# Patient Record
Sex: FEMALE | Race: WHITE | NOT HISPANIC OR LATINO | Employment: OTHER | URBAN - METROPOLITAN AREA
[De-identification: names, ages, dates, MRNs, and addresses within clinical notes are randomized per-mention and may not be internally consistent; named-entity substitution may affect disease eponyms.]

---

## 2017-01-30 ENCOUNTER — TRANSCRIBE ORDERS (OUTPATIENT)
Dept: MAMMOGRAPHY | Facility: HOSPITAL | Age: 67
End: 2017-01-30

## 2017-01-30 ENCOUNTER — HOSPITAL ENCOUNTER (OUTPATIENT)
Dept: MAMMOGRAPHY | Facility: HOSPITAL | Age: 67
Discharge: HOME | End: 2017-01-30
Attending: OBSTETRICS & GYNECOLOGY | Admitting: OBSTETRICS & GYNECOLOGY

## 2017-01-30 DIAGNOSIS — R92.8 ABNORMAL MAMMOGRAM: ICD-10-CM

## 2017-01-30 DIAGNOSIS — R92.8 ABNORMAL MAMMOGRAM: Primary | ICD-10-CM

## 2017-01-30 PROCEDURE — G0206 DX MAMMO INCL CAD UNI: HCPCS | Performed by: RADIOLOGY

## 2017-01-30 PROCEDURE — G0206 DX MAMMO INCL CAD UNI: HCPCS

## 2017-07-31 ENCOUNTER — HOSPITAL ENCOUNTER (OUTPATIENT)
Dept: MAMMOGRAPHY | Facility: HOSPITAL | Age: 67
Discharge: HOME | End: 2017-07-31
Attending: OBSTETRICS & GYNECOLOGY | Admitting: OBSTETRICS & GYNECOLOGY

## 2017-07-31 DIAGNOSIS — R92.8 ABNORMAL MAMMOGRAM: ICD-10-CM

## 2017-07-31 PROCEDURE — G0279 TOMOSYNTHESIS, MAMMO: HCPCS | Performed by: RADIOLOGY

## 2017-07-31 PROCEDURE — G0204 DX MAMMO INCL CAD BI: HCPCS

## 2017-07-31 PROCEDURE — G0204 DX MAMMO INCL CAD BI: HCPCS | Performed by: RADIOLOGY

## 2017-07-31 PROCEDURE — G0279 TOMOSYNTHESIS, MAMMO: HCPCS

## 2017-09-05 ENCOUNTER — TRANSCRIBE ORDERS (OUTPATIENT)
Dept: ADMINISTRATIVE | Facility: HOSPITAL | Age: 67
End: 2017-09-05

## 2017-09-05 DIAGNOSIS — R92.8 ABNORMAL MAMMOGRAM: Primary | ICD-10-CM

## 2018-08-01 ENCOUNTER — HOSPITAL ENCOUNTER (OUTPATIENT)
Dept: ULTRASOUND IMAGING | Facility: HOSPITAL | Age: 68
Discharge: HOME | End: 2018-08-01

## 2018-08-01 ENCOUNTER — HOSPITAL ENCOUNTER (OUTPATIENT)
Dept: MAMMOGRAPHY | Facility: HOSPITAL | Age: 68
Discharge: HOME | End: 2018-08-01
Attending: OBSTETRICS & GYNECOLOGY | Admitting: OBSTETRICS & GYNECOLOGY

## 2018-08-01 DIAGNOSIS — R92.8 ABNORMAL MAMMOGRAM: ICD-10-CM

## 2018-08-01 PROCEDURE — 77066 DX MAMMO INCL CAD BI: CPT | Performed by: RADIOLOGY

## 2018-08-01 PROCEDURE — 76642 ULTRASOUND BREAST LIMITED: CPT

## 2018-08-01 PROCEDURE — G0279 TOMOSYNTHESIS, MAMMO: HCPCS | Performed by: RADIOLOGY

## 2018-08-01 PROCEDURE — 76642 ULTRASOUND BREAST LIMITED: CPT | Performed by: RADIOLOGY

## 2018-08-01 PROCEDURE — 77066 DX MAMMO INCL CAD BI: CPT

## 2018-08-01 PROCEDURE — G0279 TOMOSYNTHESIS, MAMMO: HCPCS

## 2018-08-31 ENCOUNTER — TRANSCRIBE ORDERS (OUTPATIENT)
Dept: ADMINISTRATIVE | Facility: HOSPITAL | Age: 68
End: 2018-08-31

## 2018-08-31 DIAGNOSIS — Z12.31 VISIT FOR SCREENING MAMMOGRAM: Primary | ICD-10-CM

## 2018-09-14 ENCOUNTER — LAB REQUISITION (OUTPATIENT)
Dept: LAB | Facility: HOSPITAL | Age: 68
End: 2018-09-14

## 2018-09-14 DIAGNOSIS — Z00.00 ROUTINE GENERAL MEDICAL EXAMINATION AT A HEALTH CARE FACILITY: ICD-10-CM

## 2018-09-14 LAB
APPEARANCE FLD: ABNORMAL
COLOR FLD: YELLOW
LYMPHOCYTES NFR FLD MANUAL: 15 %
MACROPHAGE FLUID: 21 %
MONOCYTES NFR FLD: 2 %
NEUTROPHILS NFR FLD MANUAL: 62 %
RBC # FLD AUTO: 8000 /MM3
WBC # FLD: 891 /MM3

## 2018-09-14 PROCEDURE — 89051 BODY FLUID CELL COUNT: CPT

## 2019-08-02 ENCOUNTER — OFFICE VISIT (OUTPATIENT)
Dept: NEUROLOGY | Facility: CLINIC | Age: 69
End: 2019-08-02

## 2019-08-02 ENCOUNTER — HOSPITAL ENCOUNTER (OUTPATIENT)
Dept: MAMMOGRAPHY | Facility: HOSPITAL | Age: 69
Discharge: HOME | End: 2019-08-02
Attending: OBSTETRICS & GYNECOLOGY | Admitting: OBSTETRICS & GYNECOLOGY

## 2019-08-02 VITALS
SYSTOLIC BLOOD PRESSURE: 130 MMHG | OXYGEN SATURATION: 96 % | HEIGHT: 59 IN | WEIGHT: 210 LBS | BODY MASS INDEX: 42.33 KG/M2 | HEART RATE: 108 BPM | DIASTOLIC BLOOD PRESSURE: 90 MMHG

## 2019-08-02 DIAGNOSIS — M51.34 DDD (DEGENERATIVE DISC DISEASE), THORACIC: ICD-10-CM

## 2019-08-02 DIAGNOSIS — R26.9 GAIT ABNORMALITY: ICD-10-CM

## 2019-08-02 DIAGNOSIS — Z12.31 VISIT FOR SCREENING MAMMOGRAM: ICD-10-CM

## 2019-08-02 DIAGNOSIS — R27.0 ATAXIA: Primary | ICD-10-CM

## 2019-08-02 DIAGNOSIS — M51.36 DDD (DEGENERATIVE DISC DISEASE), LUMBAR: ICD-10-CM

## 2019-08-02 PROBLEM — M51.369 DDD (DEGENERATIVE DISC DISEASE), LUMBAR: Status: ACTIVE | Noted: 2019-08-02

## 2019-08-02 PROCEDURE — 77063 BREAST TOMOSYNTHESIS BI: CPT

## 2019-08-02 PROCEDURE — 77067 SCR MAMMO BI INCL CAD: CPT

## 2019-08-02 PROCEDURE — 77067 SCR MAMMO BI INCL CAD: CPT | Performed by: RADIOLOGY

## 2019-08-02 PROCEDURE — 99204 OFFICE O/P NEW MOD 45 MIN: CPT | Performed by: NURSE PRACTITIONER

## 2019-08-02 PROCEDURE — 77063 BREAST TOMOSYNTHESIS BI: CPT | Performed by: RADIOLOGY

## 2019-08-02 RX ORDER — GUAIFENESIN, PSEUDOEPHEDRINE HYDROCHLORIDE 600; 60 MG/1; MG/1
TABLET, EXTENDED RELEASE ORAL
COMMUNITY
End: 2020-06-05

## 2019-08-02 RX ORDER — ASCORBIC ACID 500 MG
500 TABLET ORAL DAILY
COMMUNITY

## 2019-08-02 RX ORDER — LOSARTAN POTASSIUM 100 MG/1
TABLET ORAL
COMMUNITY
End: 2022-08-22 | Stop reason: ALTCHOICE

## 2019-08-02 RX ORDER — FUROSEMIDE 20 MG/1
TABLET ORAL
COMMUNITY
End: 2021-12-14

## 2019-08-02 RX ORDER — CHLORAL HYDRATE 500 MG
CAPSULE ORAL
COMMUNITY

## 2019-08-02 RX ORDER — MONTELUKAST SODIUM 10 MG/1
TABLET ORAL
COMMUNITY

## 2019-08-02 RX ORDER — DULOXETIN HYDROCHLORIDE 60 MG/1
CAPSULE, DELAYED RELEASE ORAL
COMMUNITY
End: 2021-12-14

## 2019-08-02 RX ORDER — GABAPENTIN 300 MG/1
300 CAPSULE ORAL 3 TIMES DAILY
Refills: 1 | COMMUNITY
Start: 2019-07-27 | End: 2019-11-08 | Stop reason: SDUPTHER

## 2019-09-23 ENCOUNTER — TELEPHONE (OUTPATIENT)
Dept: NEUROLOGY | Facility: CLINIC | Age: 69
End: 2019-09-23

## 2019-11-08 ENCOUNTER — HOSPITAL ENCOUNTER (OUTPATIENT)
Dept: GENERAL RADIOLOGY | Facility: HOSPITAL | Age: 69
Discharge: HOME | End: 2019-11-08
Admitting: NURSE PRACTITIONER

## 2019-11-08 ENCOUNTER — OFFICE VISIT (OUTPATIENT)
Dept: NEUROLOGY | Facility: CLINIC | Age: 69
End: 2019-11-08

## 2019-11-08 ENCOUNTER — TELEPHONE (OUTPATIENT)
Dept: NEUROLOGY | Facility: CLINIC | Age: 69
End: 2019-11-08

## 2019-11-08 VITALS
BODY MASS INDEX: 42.33 KG/M2 | HEART RATE: 86 BPM | SYSTOLIC BLOOD PRESSURE: 130 MMHG | WEIGHT: 210 LBS | OXYGEN SATURATION: 98 % | DIASTOLIC BLOOD PRESSURE: 80 MMHG | HEIGHT: 59 IN

## 2019-11-08 DIAGNOSIS — R26.9 GAIT ABNORMALITY: ICD-10-CM

## 2019-11-08 DIAGNOSIS — R27.0 ATAXIA: Primary | ICD-10-CM

## 2019-11-08 DIAGNOSIS — I67.89 CEREBRAL MICROVASCULAR DISEASE: ICD-10-CM

## 2019-11-08 DIAGNOSIS — G89.29 CHRONIC PAIN OF MULTIPLE JOINTS: ICD-10-CM

## 2019-11-08 DIAGNOSIS — M51.36 DDD (DEGENERATIVE DISC DISEASE), LUMBAR: ICD-10-CM

## 2019-11-08 DIAGNOSIS — R27.0 ATAXIA: ICD-10-CM

## 2019-11-08 DIAGNOSIS — M79.2 NEUROPATHIC PAIN: ICD-10-CM

## 2019-11-08 DIAGNOSIS — M25.50 CHRONIC PAIN OF MULTIPLE JOINTS: ICD-10-CM

## 2019-11-08 DIAGNOSIS — M51.34 DDD (DEGENERATIVE DISC DISEASE), THORACIC: ICD-10-CM

## 2019-11-08 PROCEDURE — 72040 X-RAY EXAM NECK SPINE 2-3 VW: CPT

## 2019-11-08 PROCEDURE — 99214 OFFICE O/P EST MOD 30 MIN: CPT | Performed by: NURSE PRACTITIONER

## 2019-11-08 RX ORDER — GABAPENTIN 300 MG/1
300 CAPSULE ORAL 2 TIMES DAILY
Qty: 180 CAPSULE | Refills: 1 | Status: SHIPPED | OUTPATIENT
Start: 2019-11-08 | End: 2020-06-05

## 2019-11-08 NOTE — PROGRESS NOTES
Subjective:     Patient ID: Juanis Carpenter is a 68 y.o. female.    CC:   Chief Complaint   Patient presents with   • Pain   • Gait Problem       HPI:   History of Present Illness   This is a very pleasant 68-year-old female who presents for 3 month follow up on difficulty with balance and gait over the past 21 months.  Since last visit she did get an MRI of the brain without contrast and this was completed at [facility] on 9/19/2019 and per radiology report this is an unremarkable MRI of the head and imaging was also reviewed today in office and does appear to have some age-appropriate chronic small vessel ischemic changes but no acute intracranial abnormalities.  The patient tells me unfortunately she has not been able to do physical therapy since last visit because her  was recently diagnosed with prostate cancer and is going to be doing chemotherapy 5 days a week for several weeks.  Once she has gotten through this she will call us for a physical therapy order.  She tells me she has had one fall since last visit when her 2-year-old grandchild was running towards her to give her a hug and she lost her balance.  She denies any injury with this.  She did not have her walker at the time and it was inside. Symptoms really noticeable since right knee replacement 1/2018 noticing change in her gait and balance & numbness around knee. She has a lot of joint pains. She was on gabapentin 300mg BID and stopped this due to running out and feels like her pain is worsened and wondering about restarting this since it was helpful.  Denies dizziness. Has some muscular neck pain but no actual neck pain or radicular symptoms. She just feels off balance and feels like she has to hold onto walls at times to keep her balance.  She can use a rolling walker and that seems to improve her gait and she feels steady but not steady with cane or on her own. Completed home PT after surgery but none recently. She has seen  multiple providers Dr. Gaurang Gonzalez orthopedics, her primary care provider, another orthopedic specialist as well as an orthopedic spine specialist.  She did undergo a nerve and muscle study of the lower extremities and this was completed on 8/9/2018 and showed a normal study of the bilateral lower extremities with no evidence of neuropathy or muscle damage.  She has had additional x-rays of her knee which showed no acute findings.  She also underwent an MRI of the lumbar spine on 12/28/2018 and that showed multilevel degenerative disc changes most prominent at L4-L5 with some stenosis.  She tells me that she really does not have a lot of lower back pain and when she is standing for long period of time in one spot.  She denies radicular symptoms.  She also underwent an MRI of the thoracic spine even more recently on 6/3/2019 and this showed some mild degenerative disc changes with moderate osteophytes from T11-T12 and some moderate foraminal narrowing.  She tells me she has seen orthopedic spine center and if they did not recommend intervention. She has a very minimal and very intermittent tremor if she is writing or holding above but no other tremor noted.  She has no difficulty with swallowing, loss of smell or constipation.  No Parkinson's disease in her family. For now she wants to wait on neurosurgery or pain management but may consider in the future.      The following portions of the patient's history were reviewed and updated as appropriate: allergies, current medications, past family history, past medical history, past social history, past surgical history and problem list.    Past Medical History:   Diagnosis Date   • Anxiety    • Arthritis    • Arthritis    • Hypertension    • Sleep apnea        Past Surgical History:   Procedure Laterality Date   • OOPHORECTOMY      one side removed       Social History     Socioeconomic History   • Marital status:      Spouse name: Not on file   • Number of  "children: Not on file   • Years of education: Not on file   • Highest education level: Not on file   Tobacco Use   • Smoking status: Never Smoker   Substance and Sexual Activity   • Alcohol use: No     Frequency: Never   • Sexual activity: Defer       Family History   Problem Relation Age of Onset   • Cancer Mother    • Hypertension Mother    • Dementia Father    • Stroke Father    • Cancer Father    • Hypertension Father    • Migraines Maternal Aunt    • Breast cancer Neg Hx    • Ovarian cancer Neg Hx         Review of Systems   Constitutional: Negative for chills, fatigue, fever and unexpected weight change.   HENT: Negative for ear pain, hearing loss, nosebleeds, rhinorrhea and sore throat.    Eyes: Negative for photophobia, pain, discharge, itching and visual disturbance.   Respiratory: Negative for cough, chest tightness, shortness of breath and wheezing.    Cardiovascular: Negative for chest pain, palpitations and leg swelling.   Gastrointestinal: Negative for abdominal pain, blood in stool, constipation, diarrhea, nausea and vomiting.   Genitourinary: Negative for dysuria, frequency, hematuria and urgency.   Musculoskeletal: Negative for arthralgias, back pain, gait problem, joint swelling, myalgias, neck pain and neck stiffness.   Skin: Negative for rash and wound.   Allergic/Immunologic: Negative for environmental allergies and food allergies.   Neurological: Negative for dizziness, tremors, seizures, syncope, speech difficulty, weakness, light-headedness, numbness and headaches.   Hematological: Negative for adenopathy. Does not bruise/bleed easily.   Psychiatric/Behavioral: Negative for agitation, confusion, decreased concentration, hallucinations, sleep disturbance and suicidal ideas. The patient is not nervous/anxious.    All other systems reviewed and are negative.       Objective:  /80   Pulse 86   Ht 149.9 cm (59\")   Wt 95.3 kg (210 lb)   LMP  (LMP Unknown)   SpO2 98%   BMI 42.41 kg/m² "     Neurologic Exam  Mental Status   Oriented to person, place, and time.   Registration: recalls 3 of 3 objects. Recall at 5 minutes: recalls 3 of 3 objects. Follows 3 step commands.   Attention: normal. Concentration: normal.   alert     Cranial Nerves   Cranial nerves II through XII intact.      Motor Exam   Muscle bulk: normal  Overall muscle tone: normal     Strength   Strength 5/5 except as noted.   Right strength: Decreased ROM Right hip/knee but no weakness     Sensory Exam   Light touch normal.   Vibration normal.   Proprioception normal.   Pinprick normal.   Decreased light and pinprick to right knee area but otherwise normal.      Gait, Coordination, and Reflexes      Gait  Gait: wide-based (antalgic, slow gait but no shuffling noted and good arm swing, appears stable and steady with walker, guarded without walker)     Coordination   Romberg: negative  Finger to nose coordination: normal  Heel to shin coordination: normal  Tandem walking coordination: abnormal     Tremor   Resting tremor: absent  Intention tremor: present (very minimal, intermittent, kinetic tremor both hands, no resting tremor.)  Action tremor: absent     Reflexes   Right brachioradialis: 2+  Left brachioradialis: 2+  Right biceps: 2+  Left biceps: 2+  Right triceps: 2+  Left triceps: 2+  Right patellar: 2+  Left patellar: 2+  Right achilles: 2+  Left achilles: 2+  Right : 2+  Left : 2+  Right plantar: normal  Left plantar: normal  Right Loyola: absent  Left Loyola: absent  Right ankle clonus: absent  Left ankle clonus: absent  Right pendular knee jerk: absent  Left pendular knee jerk: absent  Normal finger and heel tapping bilaterally.      Physical Exam   Neck: Muscular tenderness present. No spinous process tenderness present. No neck rigidity. Decreased range of motion present. No edema and no erythema present.   Musculoskeletal:        Right shoulder: She exhibits decreased range of motion.        Left shoulder: She  exhibits decreased range of motion.   Right hip: She exhibits decreased range of motion. She exhibits no tenderness and no swelling.   Right knee: She exhibits decreased range of motion. She exhibits no swelling. No tenderness found.   Neurological: She is oriented to person, place, and time. She has an abnormal Tandem Gait Test. She has a normal Finger-Nose-Finger Test, a normal Heel to Florian Test and a normal Romberg Test.   Reflex Scores:       Tricep reflexes are 2+ on the right side and 2+ on the left side.       Bicep reflexes are 2+ on the right side and 2+ on the left side.       Brachioradialis reflexes are 2+ on the right side and 2+ on the left side.       Patellar reflexes are 2+ on the right side and 2+ on the left side.       Achilles reflexes are 2+ on the right side and 2+ on the left side.  Psychiatric: Her speech is normal and behavior is normal. Judgment and thought content normal. Her mood appears anxious. Cognition and memory are normal.     Assessment/Plan:       Juanis was seen today for pain and gait problem.    Diagnoses and all orders for this visit:    Ataxia  Comments:  continue walker. falls prevention. call when able to start PT for new order.  Orders:  -     XR Spine Cervical 2 or 3 View; Future    Gait abnormality  Comments:  continue walker. falls prevention.  Orders:  -     XR Spine Cervical 2 or 3 View; Future    Cerebral microvascular disease  Comments:  chronic, no changes, PCP took her off aspirin. No CVA. HTN well controlled. Cholesterol normal per patient.    DDD (degenerative disc disease), thoracic  Comments:  call when able to start PT for new order. consider pain management or neurosurgery in future if not improving.    DDD (degenerative disc disease), lumbar  Comments:  call when able to start PT for new order. consider pain management or neurosurgery in future if not improving.    Chronic pain of multiple joints  -     gabapentin (NEURONTIN) 300 MG capsule; Take 1 capsule  by mouth 2 (Two) Times a Day.    Neuropathic pain  Comments:  initiate gabapentin low dose         We will get an x-ray of the C-spine today.  Continue walker to prevent falls.  She will call when she is able to start physical therapy for a new order which I believe will help her significantly.  I do see some mild cerebral microvascular disease on the brain and her hypertension is well controlled and she has normal cholesterol per her report from PCP.  Would monitor this for now with no stroke symptoms and no history of CVA and being taken off aspirin by her PCP.  Could consider pain management for injections or neurosurgery consultation in the future.  She does have pain in multiple joints and recommend restarting the gabapentin at 300 mg twice a day and she is going to follow-up in 6 months or sooner if needed. Reviewed medications, potential side effects and signs and symptoms to report. Discussed risk versus benefits of treatment plan with patient and/or family-including medications, labs and radiology that may be ordered. Addressed questions and concerns during visit. Patient and/or family verbalized understanding and agree with plan.    During this visit the following were done:  Labs Reviewed []    Labs Ordered []    Radiology Reports Reviewed [x]    Radiology Ordered [x]    PCP Records Reviewed []    Referring Provider Records Reviewed []    ER Records Reviewed []    Hospital Records Reviewed []    History Obtained From Family []    Radiology Images Reviewed [x]    Other Reviewed [x]    Records Requested []      As part of this patient's treatment plan I am prescribing controlled substances. The patient has been made aware of appropriate use of such medications, including potential risk of somnolence, limited ability to drive and/or work safely, and potential for dependence or overdose. It has also been made clear that these medications are for use by the patient only, without concomitant use of alcohol or  other substances unless prescribed. Keep out of reach of children.  Regino report has been reviewed. If this is going to be prescribed long term, [facility] Controlled Substance Agreement Contract has also been read and signed by patient and myself.     EMR Dragon/Transcription Disclaimer:  Much of this encounter note is an electronic transcription of spoken language to printed text. Electronic transcription of spoken language may permit erroneous words or phrases to be inadvertently transcribed. Although I have reviewed the note for such errors, some may still exist in this documentation.      Dianna Santamaria, APRN  11/13/2019

## 2019-11-08 NOTE — TELEPHONE ENCOUNTER
Please notify the patient that we did receive the MRI of the brain radiology report and the radiologist read this is a normal MRI of the brain.

## 2019-11-11 ENCOUNTER — TELEPHONE (OUTPATIENT)
Dept: NEUROLOGY | Facility: CLINIC | Age: 69
End: 2019-11-11

## 2019-11-11 NOTE — TELEPHONE ENCOUNTER
I spoke with pt letting her know the results of the Cervical Spine and she said she would not be able to start her PT until some time in 2020 due to her 's ongoing Cancer treatment which starts on 2018.  She said she has already had a Lumbar Spine 2019, Thoracic Spine 6-3-2019 and an MRI of Head but not sure when.  She said she would call back when she needs the PT order again because before she can start it the order will have .  She wants to know what else she could do?

## 2019-11-11 NOTE — TELEPHONE ENCOUNTER
Nothing else to do for now. Just have her call us when she can start the PT for a new order. She can take tylenol. She can use biofreeze or icy hot around her neck and shoulder muscles over the counter as directed. Thanks.

## 2019-11-11 NOTE — PROGRESS NOTES
Please notify patient x-ray of the cervical spine does show extensive arthritis changes, however there is no fracture present.  When she is able to complete her physical therapy if her neck is not improving we could consider an MRI of her cervical spine at that time.  We will follow-up with her as scheduled.  Thanks, JUDITH Aguila.

## 2019-11-13 PROBLEM — M79.2 NEUROPATHIC PAIN: Status: ACTIVE | Noted: 2019-11-13

## 2019-11-14 ENCOUNTER — TELEPHONE (OUTPATIENT)
Dept: NEUROLOGY | Facility: CLINIC | Age: 69
End: 2019-11-14

## 2019-11-14 NOTE — TELEPHONE ENCOUNTER
Pt called and said she was concerned about her Gabapentin prescription because she received a letter and she was thinking it needed a PA so I did a PA and it is approved and called [facility] to have them run it and it is in the process so I tried to call the pt to let her know but it went to vm so I did not lvm because she told prior that she doesn't check her vm, will wait for pt to call back and reschedule

## 2020-06-05 ENCOUNTER — OFFICE VISIT (OUTPATIENT)
Dept: NEUROLOGY | Facility: CLINIC | Age: 70
End: 2020-06-05

## 2020-06-05 ENCOUNTER — LAB (OUTPATIENT)
Dept: LAB | Facility: HOSPITAL | Age: 70
End: 2020-06-05

## 2020-06-05 VITALS
BODY MASS INDEX: 45.34 KG/M2 | SYSTOLIC BLOOD PRESSURE: 130 MMHG | HEART RATE: 97 BPM | OXYGEN SATURATION: 97 % | WEIGHT: 216 LBS | TEMPERATURE: 98.4 F | HEIGHT: 58 IN | DIASTOLIC BLOOD PRESSURE: 80 MMHG

## 2020-06-05 DIAGNOSIS — M51.34 DDD (DEGENERATIVE DISC DISEASE), THORACIC: ICD-10-CM

## 2020-06-05 DIAGNOSIS — G89.29 CHRONIC PAIN OF MULTIPLE JOINTS: ICD-10-CM

## 2020-06-05 DIAGNOSIS — M79.2 NEUROPATHIC PAIN: Primary | ICD-10-CM

## 2020-06-05 DIAGNOSIS — M25.50 CHRONIC PAIN OF MULTIPLE JOINTS: ICD-10-CM

## 2020-06-05 DIAGNOSIS — M89.9 DISORDER OF BONE, UNSPECIFIED: ICD-10-CM

## 2020-06-05 DIAGNOSIS — R73.03 PREDIABETES: ICD-10-CM

## 2020-06-05 DIAGNOSIS — R79.9 ABNORMAL FINDING OF BLOOD CHEMISTRY, UNSPECIFIED: ICD-10-CM

## 2020-06-05 DIAGNOSIS — R26.9 GAIT ABNORMALITY: ICD-10-CM

## 2020-06-05 DIAGNOSIS — M51.36 DDD (DEGENERATIVE DISC DISEASE), LUMBAR: ICD-10-CM

## 2020-06-05 DIAGNOSIS — M50.30 DDD (DEGENERATIVE DISC DISEASE), CERVICAL: ICD-10-CM

## 2020-06-05 DIAGNOSIS — R27.0 ATAXIA: ICD-10-CM

## 2020-06-05 DIAGNOSIS — M79.2 NEUROPATHIC PAIN: ICD-10-CM

## 2020-06-05 LAB
25(OH)D3 SERPL-MCNC: 32.3 NG/ML (ref 30–100)
CK MB SERPL-CCNC: 2.02 NG/ML
CK SERPL-CCNC: 67 U/L (ref 20–180)
CRP SERPL-MCNC: 0.24 MG/DL (ref 0–0.5)
FOLATE SERPL-MCNC: >20 NG/ML (ref 4.78–24.2)
HBA1C MFR BLD: 5.5 % (ref 4.8–5.6)
T-UPTAKE NFR SERPL: 1.22 TBI (ref 0.8–1.3)
T4 SERPL-MCNC: 8.34 MCG/DL (ref 4.5–11.7)
TSH SERPL DL<=0.05 MIU/L-ACNC: 1.31 UIU/ML (ref 0.27–4.2)
VIT B12 BLD-MCNC: 633 PG/ML (ref 211–946)

## 2020-06-05 PROCEDURE — 86235 NUCLEAR ANTIGEN ANTIBODY: CPT

## 2020-06-05 PROCEDURE — 82553 CREATINE MB FRACTION: CPT

## 2020-06-05 PROCEDURE — 36415 COLL VENOUS BLD VENIPUNCTURE: CPT

## 2020-06-05 PROCEDURE — 82746 ASSAY OF FOLIC ACID SERUM: CPT

## 2020-06-05 PROCEDURE — 86618 LYME DISEASE ANTIBODY: CPT

## 2020-06-05 PROCEDURE — 83921 ORGANIC ACID SINGLE QUANT: CPT

## 2020-06-05 PROCEDURE — 86256 FLUORESCENT ANTIBODY TITER: CPT

## 2020-06-05 PROCEDURE — 84436 ASSAY OF TOTAL THYROXINE: CPT

## 2020-06-05 PROCEDURE — 84155 ASSAY OF PROTEIN SERUM: CPT

## 2020-06-05 PROCEDURE — 83036 HEMOGLOBIN GLYCOSYLATED A1C: CPT

## 2020-06-05 PROCEDURE — 86334 IMMUNOFIX E-PHORESIS SERUM: CPT

## 2020-06-05 PROCEDURE — 99214 OFFICE O/P EST MOD 30 MIN: CPT | Performed by: NURSE PRACTITIONER

## 2020-06-05 PROCEDURE — 85652 RBC SED RATE AUTOMATED: CPT

## 2020-06-05 PROCEDURE — 82306 VITAMIN D 25 HYDROXY: CPT

## 2020-06-05 PROCEDURE — 82784 ASSAY IGA/IGD/IGG/IGM EACH: CPT

## 2020-06-05 PROCEDURE — 86431 RHEUMATOID FACTOR QUANT: CPT

## 2020-06-05 PROCEDURE — 84479 ASSAY OF THYROID (T3 OR T4): CPT

## 2020-06-05 PROCEDURE — 84443 ASSAY THYROID STIM HORMONE: CPT

## 2020-06-05 PROCEDURE — 82550 ASSAY OF CK (CPK): CPT

## 2020-06-05 PROCEDURE — 84165 PROTEIN E-PHORESIS SERUM: CPT

## 2020-06-05 PROCEDURE — 86038 ANTINUCLEAR ANTIBODIES: CPT

## 2020-06-05 PROCEDURE — 86225 DNA ANTIBODY NATIVE: CPT

## 2020-06-05 PROCEDURE — 82607 VITAMIN B-12: CPT

## 2020-06-05 PROCEDURE — 86140 C-REACTIVE PROTEIN: CPT

## 2020-06-05 RX ORDER — GABAPENTIN 100 MG/1
100 CAPSULE ORAL 3 TIMES DAILY
Qty: 90 CAPSULE | Refills: 5 | Status: SHIPPED | OUTPATIENT
Start: 2020-06-05 | End: 2020-09-03

## 2020-06-05 NOTE — PROGRESS NOTES
Subjective:     Patient ID: Juanis Carpenter is a 69 y.o. female.    CC:   Chief Complaint   Patient presents with   • Gait Problem   • Balance Issues   • Arthritis       HPI:   History of Present Illness   This is a very pleasant 69-year-old female who presents for 8 month follow up on difficulty with balance and gait over the past 2 years. She is accompanied by her  during today's visit.   Since last visit she did have an x-ray of her cervical spine on 11/8/2019 showing extensive degenerative disc changes from C4-C6 with no acute fracture or dislocation.  She still has not tried physical therapy and is now open to doing physical therapy.  She continues to have issues with her gait, balance but no actual dizziness.  She feels like she just cannot move her legs or cannot get them going.  She has a lot of pain in multiple joints and is wondering what else she could do to treat her arthritis.  She was taking the gabapentin 300 mg twice a day but she tells me this made her feel foggy but did help some with her symptoms and wondering if there is another dose she might take that would tolerate better. Symptoms really noticeable since right knee replacement 1/2018 noticing change in her gait and balance & numbness around knee. Denies dizziness. Has some muscular neck pain but no actual neck pain or radicular symptoms. She just feels off balance and feels like she has to hold onto walls at times to keep her balance.  She can use a rolling walker and that seems to improve her gait and she feels steady but not steady with cane or on her own. She has a very minimal and very intermittent tremor if she is writing or holding pen but no other tremor noted. No resting tremor. She has no difficulty with swallowing, loss of smell or constipation.  No Parkinson's disease in her family. Wants to wait on neurosurgery or pain management but may consider in the future. As long as she has her walker she is steady, but otherwise she  is fearful to fall. Has had no recent falls.    Prior extensive workup: MRI of the brain without contrast and this was completed at [facility] on 9/19/2019 and per radiology report this is an unremarkable MRI of the head and imaging was also reviewed today in office and does appear to have some age-appropriate chronic small vessel ischemic changes but no acute intracranial abnormalities. She has seen multiple providers Dr. Gaurang Gonzalez orthopedics, her primary care provider, another orthopedic specialist as well as an orthopedic spine specialist.  She did undergo a nerve and muscle study of the lower extremities and this was completed on 8/9/2018 and showed a normal study of the bilateral lower extremities with no evidence of neuropathy or muscle damage.  She has had additional x-rays of her knee which showed no acute findings.  She also underwent an MRI of the lumbar spine on 12/28/2018 and that showed multilevel degenerative disc changes most prominent at L4-L5 with some stenosis.  She tells me that she really does not have a lot of lower back pain and when she is standing for long period of time in one spot. She also underwent an MRI of the thoracic spine even more recently on 6/3/2019 and this showed some mild degenerative disc changes with moderate osteophytes from T11-T12 and some moderate foraminal narrowing.      The following portions of the patient's history were reviewed and updated as appropriate: allergies, current medications, past family history, past medical history, past social history, past surgical history and problem list.    Past Medical History:   Diagnosis Date   • Anxiety    • Arthritis    • Arthritis    • Hypertension    • Sleep apnea        Past Surgical History:   Procedure Laterality Date   • OOPHORECTOMY      one side removed       Social History     Socioeconomic History   • Marital status:      Spouse name: Not on file   • Number of children: Not on file   •  "Years of education: Not on file   • Highest education level: Not on file   Tobacco Use   • Smoking status: Never Smoker   • Smokeless tobacco: Never Used   Substance and Sexual Activity   • Alcohol use: No     Frequency: Never   • Drug use: Never   • Sexual activity: Defer       Family History   Problem Relation Age of Onset   • Cancer Mother    • Hypertension Mother    • Dementia Father    • Stroke Father    • Cancer Father    • Hypertension Father    • Migraines Maternal Aunt    • Breast cancer Neg Hx    • Ovarian cancer Neg Hx         Review of Systems   Constitutional: Negative for chills, fatigue, fever and unexpected weight change.   HENT: Negative for ear pain, hearing loss, nosebleeds, rhinorrhea and sore throat.    Eyes: Negative for photophobia, pain, discharge, itching and visual disturbance.   Respiratory: Negative for cough, chest tightness, shortness of breath and wheezing.    Cardiovascular: Negative for chest pain, palpitations and leg swelling.   Gastrointestinal: Negative for abdominal pain, blood in stool, constipation, diarrhea, nausea and vomiting.   Genitourinary: Negative for dysuria, frequency, hematuria and urgency.   Musculoskeletal: Negative for arthralgias, back pain, gait problem, joint swelling, myalgias, neck pain and neck stiffness.   Skin: Negative for rash and wound.   Allergic/Immunologic: Negative for environmental allergies and food allergies.   Neurological: Negative for dizziness, tremors, seizures, syncope, speech difficulty, weakness, light-headedness, numbness and headaches.   Hematological: Negative for adenopathy. Does not bruise/bleed easily.   Psychiatric/Behavioral: Negative for agitation, confusion, decreased concentration, hallucinations, sleep disturbance and suicidal ideas. The patient is not nervous/anxious.    All other systems reviewed and are negative.       Objective:  /80   Pulse 97   Temp 98.4 °F (36.9 °C)   Ht 147.3 cm (58\")   Wt 98 kg (216 lb)   " LMP  (LMP Unknown)   SpO2 97%   BMI 45.14 kg/m²     Neurologic Exam  Mental Status   Oriented to person, place, and time.   Registration: recalls 3 of 3 objects. Recall at 5 minutes: recalls 3 of 3 objects. Follows 3 step commands.   Attention: normal. Concentration: normal.   alert     Cranial Nerves   Cranial nerves II through XII intact.      Motor Exam   Muscle bulk: normal  Overall muscle tone: normal     Strength   Strength 5/5 except as noted.   Right strength: Decreased ROM Right hip/knee but no weakness     Sensory Exam   Light touch normal.   Vibration normal.   Proprioception normal.   Pinprick normal.   Decreased light and pinprick to right knee area but otherwise normal.      Gait, Coordination, and Reflexes      Gait  Gait: wide-based (antalgic, slow gait but no shuffling noted and good arm swing, appears stable and steady with walker, guarded without walker)     Coordination   Romberg: negative  Finger to nose coordination: normal  Heel to shin coordination: normal  Tandem walking coordination: abnormal     Tremor   Resting tremor: absent  Intention tremor: present (very minimal, intermittent, kinetic tremor both hands, no resting tremor.)  Action tremor: absent     Reflexes   Right brachioradialis: 2+  Left brachioradialis: 2+  Right biceps: 2+  Left biceps: 2+  Right triceps: 2+  Left triceps: 2+  Right patellar: 2+  Left patellar: 2+  Right achilles: 2+  Left achilles: 2+  Right : 2+  Left : 2+  Right plantar: normal  Left plantar: normal  Right Loyola: absent  Left Loyola: absent  Right ankle clonus: absent  Left ankle clonus: absent  Right pendular knee jerk: absent  Left pendular knee jerk: absent  Normal finger and heel tapping bilaterally. Rises slowly from chair, some mild shuffling but also guarded in gait d/t fear of falls and balance. Normal arm swing.       Physical Exam  Neck: Muscular tenderness present. No spinous process tenderness present. No neck rigidity. Decreased  range of motion present. No edema and no erythema present.   Musculoskeletal:        Right shoulder: She exhibits decreased range of motion.        Left shoulder: She exhibits decreased range of motion.   Right hip: She exhibits decreased range of motion. She exhibits no tenderness and no swelling.   Right knee: She exhibits decreased range of motion. She exhibits no swelling. No tenderness found.   Neurological: She is oriented to person, place, and time. She has an abnormal Tandem Gait Test. She has a normal Finger-Nose-Finger Test, a normal Heel to Florian Test and a normal Romberg Test.   Reflex Scores:       Tricep reflexes are 2+ on the right side and 2+ on the left side.       Bicep reflexes are 2+ on the right side and 2+ on the left side.       Brachioradialis reflexes are 2+ on the right side and 2+ on the left side.       Patellar reflexes are 2+ on the right side and 2+ on the left side.       Achilles reflexes are 2+ on the right side and 2+ on the left side.  Psychiatric: Her speech is normal and behavior is normal. Judgment and thought content normal. Her mood appears anxious. Cognition and memory are normal.   Assessment/Plan:       Juanis was seen today for gait problem, balance issues and arthritis.    Diagnoses and all orders for this visit:    Neuropathic pain  -     gabapentin (NEURONTIN) 100 MG capsule; Take 1 capsule by mouth 3 (Three) Times a Day.  -     KIERA by IFA, Reflex 9-biomarkers profile; Future  -     CK Total & CKMB; Future  -     Vitamin B12 & Folate; Future  -     Thyroid Panel With TSH; Future  -     Rheumatoid Factor; Future  -     PIPER + PE; Future  -     Methylmalonic Acid, Serum; Future  -     Hemoglobin A1c; Future  -     B. Burgdorferi Antibodies, WB Reflex; Future  -     Anti-Kanika 1 Antibody, IgG; Future  -     Anti-Hu Antibodies; Future  -     Sedimentation Rate; Future  -     C-reactive Protein; Future  -     Vitamin D 25 Hydroxy; Future    Gait abnormality  -     Ambulatory  Referral to Physical Therapy Evaluate and treat  -     MRI Cervical Spine Without Contrast; Future    Ataxia  -     Ambulatory Referral to Physical Therapy Evaluate and treat  -     MRI Cervical Spine Without Contrast; Future    DDD (degenerative disc disease), thoracic  -     Ambulatory Referral to Physical Therapy Evaluate and treat  -     Ambulatory Referral to Rheumatology    Chronic pain of multiple joints  -     Ambulatory Referral to Physical Therapy Evaluate and treat  -     KIERA by IFA, Reflex 9-biomarkers profile; Future  -     CK Total & CKMB; Future  -     Vitamin B12 & Folate; Future  -     Thyroid Panel With TSH; Future  -     Rheumatoid Factor; Future  -     PIPER + PE; Future  -     Methylmalonic Acid, Serum; Future  -     Hemoglobin A1c; Future  -     B. Burgdorferi Antibodies, WB Reflex; Future  -     Anti-Kanika 1 Antibody, IgG; Future  -     Anti-Hu Antibodies; Future  -     Sedimentation Rate; Future  -     C-reactive Protein; Future  -     Vitamin D 25 Hydroxy; Future  -     Ambulatory Referral to Rheumatology    DDD (degenerative disc disease), lumbar  -     Ambulatory Referral to Physical Therapy Evaluate and treat  -     Ambulatory Referral to Rheumatology    DDD (degenerative disc disease), cervical  -     Ambulatory Referral to Physical Therapy Evaluate and treat  -     MRI Cervical Spine Without Contrast; Future  -     Ambulatory Referral to Rheumatology    Prediabetes   -     Thyroid Panel With TSH; Future    Abnormal finding of blood chemistry, unspecified   -     Hemoglobin A1c; Future    Disorder of bone, unspecified   -     Vitamin D 25 Hydroxy; Future           Labs today to r/o other etiology. PT eval and treat. MRI C spine to r/o stenosis. Will lower gabapentin 100mg BID. F/U in 3 months for re-eval and will have Dr. Rodrigues check her gait at next visit too. Consider trial of low dose sinemet in future for trial. Multifactorial. Reviewed medications, potential side effects and signs and  symptoms to report. Discussed risk versus benefits of treatment plan with patient and/or family-including medications, labs and radiology that may be ordered. Addressed questions and concerns during visit. Patient and/or family verbalized understanding and agree with plan.    As part of this patient's treatment plan I am prescribing controlled substances. The patient has been made aware of appropriate use of such medications, including potential risk of somnolence, limited ability to drive and/or work safely, and potential for dependence or overdose. It has also been made clear that these medications are for use by the patient only, without concomitant use of alcohol or other substances unless prescribed. Keep out of reach of children.  Regino report has been reviewed. If this is going to be prescribed long term, [facility] [place] has also been read and signed by patient and myself.  Regino #[id number] reviewed.    During this visit the following were done:  Labs Reviewed [x]    Labs Ordered [x]    Radiology Reports Reviewed [x]    Radiology Ordered [x]    PCP Records Reviewed []    Referring Provider Records Reviewed []    ER Records Reviewed []    Hospital Records Reviewed []    History Obtained From Family []    Radiology Images Reviewed []    Other Reviewed [x]    Records Requested []      Dianna Santamaria, JUDITH  6/5/2020

## 2020-06-06 LAB
CHROMATIN AB SERPL-ACNC: <10 IU/ML (ref 0–14)
ERYTHROCYTE [SEDIMENTATION RATE] IN BLOOD: 2 MM/HR (ref 0–30)

## 2020-06-08 ENCOUNTER — TELEPHONE (OUTPATIENT)
Dept: NEUROLOGY | Facility: CLINIC | Age: 70
End: 2020-06-08

## 2020-06-08 LAB
B BURGDOR IGG+IGM SER-ACNC: <0.91 ISR (ref 0–0.9)
B BURGDOR IGM SER-ACNC: <0.8 INDEX (ref 0–0.79)
ENA JO1 AB SER-ACNC: <0.2 AI (ref 0–0.9)

## 2020-06-08 NOTE — TELEPHONE ENCOUNTER
----- Message from JUDITH Nye sent at 6/8/2020  7:52 AM EDT -----  Please notify patient all of her labs resulted so far are completely normal including rheumatoid arthritis test negative, vitamin D and vitamin B12 levels are normal, inflammatory markers are normal, thyroid and muscle enzymes are normal and diabetes test is in normal range. Please fax copy of all labs to PCP. A few labs are pending and we will keep her posted once those are received. Thanks, JUDITH Aguila

## 2020-06-08 NOTE — PROGRESS NOTES
Please notify patient all of her labs resulted so far are completely normal including rheumatoid arthritis test negative, vitamin D and vitamin B12 levels are normal, inflammatory markers are normal, thyroid and muscle enzymes are normal and diabetes test is in normal range. Please fax copy of all labs to PCP. A few labs are pending and we will keep her posted once those are received. Thanks, JUDITH Aguila

## 2020-06-09 LAB
ALBUMIN SERPL-MCNC: 4.1 G/DL (ref 2.9–4.4)
ALBUMIN/GLOB SERPL: 1.5 {RATIO} (ref 0.7–1.7)
ALPHA1 GLOB FLD ELPH-MCNC: 0.3 G/DL (ref 0–0.4)
ALPHA2 GLOB SERPL ELPH-MCNC: 0.8 G/DL (ref 0.4–1)
ANA HOMOGEN TITR SER: NORMAL {TITER}
ANA SER QL IA: POSITIVE
B-GLOBULIN SERPL ELPH-MCNC: 1 G/DL (ref 0.7–1.3)
CENTROMERE B AB SER-ACNC: <0.2 AI (ref 0–0.9)
CHROMATIN AB SERPL-ACNC: <0.2 AI (ref 0–0.9)
DSDNA AB SER-ACNC: <1 IU/ML (ref 0–9)
ENA RNP AB SER-ACNC: <0.2 AI (ref 0–0.9)
ENA SCL70 AB SER-ACNC: <0.2 AI (ref 0–0.9)
ENA SM AB SER-ACNC: <0.2 AI (ref 0–0.9)
ENA SS-A AB SER-ACNC: <0.2 AI (ref 0–0.9)
ENA SS-B AB SER-ACNC: <0.2 AI (ref 0–0.9)
GAMMA GLOB SERPL ELPH-MCNC: 0.7 G/DL (ref 0.4–1.8)
GLOBULIN SER CALC-MCNC: 2.9 G/DL (ref 2.2–3.9)
IGA SERPL-MCNC: 97 MG/DL (ref 87–352)
IGG SERPL-MCNC: 668 MG/DL (ref 586–1602)
IGM SERPL-MCNC: 80 MG/DL (ref 26–217)
INTERPRETATION SERPL IEP-IMP: NORMAL
Lab: ABNORMAL
Lab: NORMAL
M-SPIKE: NORMAL G/DL
PROT SERPL-MCNC: 7 G/DL (ref 6–8.5)

## 2020-06-10 ENCOUNTER — TELEPHONE (OUTPATIENT)
Dept: NEUROLOGY | Facility: CLINIC | Age: 70
End: 2020-06-10

## 2020-06-10 LAB — HU1 AB TITR SER: NORMAL TITER

## 2020-06-10 NOTE — TELEPHONE ENCOUNTER
I spoke with pt and gave the results below and let her know I was working on scheduling her with  Rheumatology in [place] and they only schedule on Mondays and Wednesday and had to [facility] asking for a return call. Faxed all labs to pcp

## 2020-06-10 NOTE — PROGRESS NOTES
Labs continue to remain essentially normal. Her autoimmune general test is borderline positive, but this can also be positive in health adults with no actual autoimmune disorder. Please fax all labs to rheumatology. Patient can keep rheumatology appointment and discuss further workup and recommendations with them. Thanks, JUDITH Green    Fax copy of all recent labs to PCP

## 2020-06-10 NOTE — TELEPHONE ENCOUNTER
----- Message from JUDITH Nye sent at 6/10/2020  8:43 AM EDT -----  Labs continue to remain essentially normal. Her autoimmune general test is borderline positive, but this can also be positive in health adults with no actual autoimmune disorder. Please fax all labs to rheumatology. Patient can keep rheumatology appointment and discuss further workup and recommendations with them. Thanks, JUDITH Green    Fax copy of all recent labs to PCP

## 2020-06-14 LAB
Lab: NORMAL
METHYLMALONATE SERPL-SCNC: 83 NMOL/L (ref 0–378)

## 2020-06-22 ENCOUNTER — TELEPHONE (OUTPATIENT)
Dept: NEUROLOGY | Facility: CLINIC | Age: 70
End: 2020-06-22

## 2020-06-22 ENCOUNTER — TRANSCRIBE ORDERS (OUTPATIENT)
Dept: ADMINISTRATIVE | Facility: HOSPITAL | Age: 70
End: 2020-06-22

## 2020-06-22 DIAGNOSIS — Z12.31 VISIT FOR SCREENING MAMMOGRAM: Primary | ICD-10-CM

## 2020-06-22 NOTE — TELEPHONE ENCOUNTER
PT CALLED REGARDING HER REFERRAL TO RHEUMATOLOGY. PT STATES SHE CALLED TO MAKE AN APPT BUT STATES THAT  RHEUMATOLOGY STATED THEY DID NOT HAVE ANYTHING AND THEY NEED A REFERRAL FAXED TO NUMBER [phone]. UPON LOOKING AT PT'S CHART, A REFERRAL WAS SUBMITTED ON 6/5/20 BUT PT STATED SHE NEEDED IT RE-FAXED OVER PER WHAT RHEUMATOLOGY OFFICE SAID      CALL BACK- [phone]

## 2020-06-22 NOTE — TELEPHONE ENCOUNTER
I FAXED THE REFERRAL TO THE # SUPPLIED BY PT, I WAS NOT ABLE TO FAX WITH THE # THAT  RHEUMATOLOGY SUPPLIED ME.

## 2020-06-23 ENCOUNTER — HOSPITAL ENCOUNTER (OUTPATIENT)
Dept: MRI IMAGING | Facility: HOSPITAL | Age: 70
Discharge: HOME | End: 2020-06-23
Admitting: NURSE PRACTITIONER

## 2020-06-23 DIAGNOSIS — R26.9 GAIT ABNORMALITY: ICD-10-CM

## 2020-06-23 DIAGNOSIS — R27.0 ATAXIA: ICD-10-CM

## 2020-06-23 DIAGNOSIS — M50.30 DDD (DEGENERATIVE DISC DISEASE), CERVICAL: ICD-10-CM

## 2020-06-23 PROCEDURE — 72141 MRI NECK SPINE W/O DYE: CPT

## 2020-06-25 ENCOUNTER — TELEPHONE (OUTPATIENT)
Dept: NEUROLOGY | Facility: CLINIC | Age: 70
End: 2020-06-25

## 2020-06-25 NOTE — TELEPHONE ENCOUNTER
----- Message from JUDITH Nye sent at 6/25/2020  9:02 AM EDT -----  Please notify the patient that the MRI of her neck does show several bulging disc with the most significant at the level of C4-C5.  There is some narrowing of the spinal canal at this area.  If she continues to improve with physical therapy that is great.  I am happy to refer her to a neurosurgeon just to look at her neck and make any additional recommendations.  Let me know if she is interested in this referral.  This does not mean she will have surgery or any interventions but will just be a consultation to get their expert opinion.  Thanks, JUDITH Aguila.  Please fax a copy to her PCP.

## 2020-06-25 NOTE — PROGRESS NOTES
Please notify the patient that the MRI of her neck does show several bulging disc with the most significant at the level of C4-C5.  There is some narrowing of the spinal canal at this area.  If she continues to improve with physical therapy that is great.  I am happy to refer her to a neurosurgeon just to look at her neck and make any additional recommendations.  Let me know if she is interested in this referral.  This does not mean she will have surgery or any interventions but will just be a consultation to get their expert opinion.  Thanks, JUDITH Aguila.  Please fax a copy to her PCP.

## 2020-06-25 NOTE — TELEPHONE ENCOUNTER
PT AWARE OF THE RESULTS AND RECOMMENDATIONS IF NEEDED WILL CALL BACK TO ASK FOR A REFERRAL FOR NEUROSUGERY AFTER HER PT IS COMPLETED

## 2020-09-03 ENCOUNTER — OFFICE VISIT (OUTPATIENT)
Dept: NEUROLOGY | Facility: CLINIC | Age: 70
End: 2020-09-03

## 2020-09-03 VITALS
DIASTOLIC BLOOD PRESSURE: 72 MMHG | TEMPERATURE: 97.7 F | HEART RATE: 95 BPM | OXYGEN SATURATION: 97 % | HEIGHT: 59 IN | BODY MASS INDEX: 43.34 KG/M2 | WEIGHT: 215 LBS | SYSTOLIC BLOOD PRESSURE: 134 MMHG

## 2020-09-03 DIAGNOSIS — M79.2 NEUROPATHIC PAIN: ICD-10-CM

## 2020-09-03 DIAGNOSIS — M50.30 DDD (DEGENERATIVE DISC DISEASE), CERVICAL: ICD-10-CM

## 2020-09-03 DIAGNOSIS — R26.9 GAIT ABNORMALITY: ICD-10-CM

## 2020-09-03 DIAGNOSIS — M51.36 DDD (DEGENERATIVE DISC DISEASE), LUMBAR: ICD-10-CM

## 2020-09-03 DIAGNOSIS — M51.34 DDD (DEGENERATIVE DISC DISEASE), THORACIC: ICD-10-CM

## 2020-09-03 DIAGNOSIS — M25.50 CHRONIC PAIN OF MULTIPLE JOINTS: ICD-10-CM

## 2020-09-03 DIAGNOSIS — G25.0 ESSENTIAL TREMOR: ICD-10-CM

## 2020-09-03 DIAGNOSIS — G89.29 CHRONIC PAIN OF MULTIPLE JOINTS: ICD-10-CM

## 2020-09-03 DIAGNOSIS — I67.89 CEREBRAL MICROVASCULAR DISEASE: Primary | ICD-10-CM

## 2020-09-03 PROCEDURE — 99214 OFFICE O/P EST MOD 30 MIN: CPT | Performed by: NURSE PRACTITIONER

## 2020-09-03 RX ORDER — ASPIRIN 81 MG/1
81 TABLET ORAL DAILY
Qty: 90 TABLET | Refills: 3 | Status: SHIPPED | OUTPATIENT
Start: 2020-09-03 | End: 2021-08-18

## 2020-09-03 RX ORDER — GABAPENTIN 100 MG/1
100 CAPSULE ORAL 3 TIMES DAILY PRN
Qty: 90 CAPSULE | Refills: 5
Start: 2020-09-03 | End: 2021-01-04

## 2020-09-03 RX ORDER — PREDNISONE 1 MG/1
7.5 TABLET ORAL DAILY
COMMUNITY
Start: 2020-07-28 | End: 2020-12-11

## 2020-09-03 NOTE — PROGRESS NOTES
Subjective:     Patient ID: Juanis Carpenter is a 69 y.o. female.    CC:   Chief Complaint   Patient presents with   • Gait Problem   • Peripheral Neuropathy       HPI:   History of Present Illness   This is a very pleasant 69-year-old female who presents for 3 month follow up on difficulty with balance and gait over the past 2+ years.   She had MRI of the C-spine without contrast completed on 6/23/2020 which does show multilevel degenerative disc changes from C4-C5, C5-C6 and C6-C7 with moderate canal stenosis from C4-C5 and mild canal stenosis from C5-C6 and C6-C7.  No significant cervical myelopathy.  No trauma.  No spinal cord lesions.  She has started going to [facility] for rehab and she has significantly improved with her gait and balance.  I was able to review their notes today and she really had just not completely recovered from her knee replacement surgery.  She has moved from a rolling walker, to a walker and now to using a cane for gait.  She is feeling much better overall.  She does have some neck pain but prefers to continue with physical therapy versus going to a surgeon which she would likely not consider surgical intervention.  She takes the gabapentin 100 mg as needed versus daily.  She does have multiple joint pains and has been evaluated by Dr. Man with [facility] and started on low-dose prednisone which has helped.  Right knee replacement was in January 2018 and that is when her gait issues started.  She also does have some mild tremor in both hands more noticeable with holding her coffee, holding a piece of paper or writing.  This is not progressed or changed.  Denies resting tremor.  She has no difficulty with swallowing, loss of smell or constipation.  She has had no falls.  She really feels improved with her physical therapy and wishes to continue this.  She does also have cerebral microvascular changes on brain.  She does have hypertension.  Would be willing to take  aspirin 81 mg daily.  No stroke or heart attack history.  She does also note that since being on the steroid she will have some sweats and breaking out in sweats when she gets hot.  She will discuss this with rheumatology.  She is always been hot natured but this is been a little worse than at her baseline.     Prior extensive workup: MRI of the brain without contrast and this was completed at [facility] on 9/19/2019 and per radiology report this is an unremarkable MRI of the head and imaging was also reviewed previously in office and does appear to have some age-appropriate chronic small vessel ischemic changes but no acute intracranial abnormalities. She has seen multiple providers [facility]. [facility], her primary care provider, another orthopedic specialist as well as an orthopedic spine specialist.  She did undergo a nerve and muscle study of the lower extremities and this was completed on 8/9/2018 and showed a normal study of the bilateral lower extremities with no evidence of neuropathy or muscle damage.  She has had additional x-rays of her knee which showed no acute findings.  She also underwent an MRI of the lumbar spine on 12/28/2018 and that showed multilevel degenerative disc changes most prominent at L4-L5 with some stenosis.  She tells me that she really does not have a lot of lower back pain and when she is standing for long period of time in one spot. She also underwent an MRI of the thoracic spine even more recently on 6/3/2019 and this showed some mild degenerative disc changes with moderate osteophytes from T11-T12 and some moderate foraminal narrowing.      The following portions of the patient's history were reviewed and updated as appropriate: allergies, current medications, past family history, past medical history, past social history, past surgical history and problem list.    Past Medical History:   Diagnosis Date   • Anxiety    • Arthritis    • Arthritis    •  Hypertension    • Sleep apnea        Past Surgical History:   Procedure Laterality Date   • OOPHORECTOMY      one side removed       Social History     Socioeconomic History   • Marital status:      Spouse name: Not on file   • Number of children: Not on file   • Years of education: Not on file   • Highest education level: Not on file   Tobacco Use   • Smoking status: Never Smoker   • Smokeless tobacco: Never Used   Substance and Sexual Activity   • Alcohol use: No     Frequency: Never   • Drug use: Never   • Sexual activity: Defer       Family History   Problem Relation Age of Onset   • Cancer Mother    • Hypertension Mother    • Dementia Father    • Stroke Father    • Cancer Father    • Hypertension Father    • Migraines Maternal Aunt    • Breast cancer Neg Hx    • Ovarian cancer Neg Hx         Review of Systems   Constitutional: Negative for chills, fatigue, fever and unexpected weight change.   HENT: Negative for ear pain, hearing loss, nosebleeds, rhinorrhea and sore throat.    Eyes: Negative for photophobia, pain, discharge, itching and visual disturbance.   Respiratory: Negative for cough, chest tightness, shortness of breath and wheezing.    Cardiovascular: Negative for chest pain, palpitations and leg swelling.   Gastrointestinal: Negative for abdominal pain, blood in stool, constipation, diarrhea, nausea and vomiting.   Genitourinary: Negative for dysuria, frequency, hematuria and urgency.   Musculoskeletal: Negative for arthralgias, back pain, gait problem, joint swelling, myalgias, neck pain and neck stiffness.   Skin: Negative for rash and wound.   Allergic/Immunologic: Negative for environmental allergies and food allergies.   Neurological: Negative for dizziness, tremors, seizures, syncope, speech difficulty, weakness, light-headedness, numbness and headaches.   Hematological: Negative for adenopathy. Does not bruise/bleed easily.   Psychiatric/Behavioral: Negative for agitation, confusion,  "decreased concentration, hallucinations, sleep disturbance and suicidal ideas. The patient is not nervous/anxious.    All other systems reviewed and are negative.       Objective:  /72   Pulse 95   Temp 97.7 °F (36.5 °C)   Ht 149.9 cm (59\")   Wt 97.5 kg (215 lb)   LMP  (LMP Unknown)   SpO2 97%   BMI 43.42 kg/m²     Neurologic Exam  Mental Status   Oriented to person, place, and time.   Registration: recalls 3 of 3 objects. Recall at 5 minutes: recalls 3 of 3 objects. Follows 3 step commands.   Attention: normal. Concentration: normal.   alert     Cranial Nerves   Cranial nerves II through XII intact.      Motor Exam   Muscle bulk: normal  Overall muscle tone: normal     Strength   Strength 5/5 except as noted.   Right strength: Decreased ROM Right hip/knee but no weakness     Sensory Exam   Light touch normal.   Vibration normal.   Proprioception normal.   Pinprick normal.   Decreased light and pinprick to right knee area but otherwise normal.      Gait, Coordination, and Reflexes      Gait  Gait: wide-based (antalgic, slow gait but no shuffling noted and good arm swing, appears stable and steady with cane today-significantly improved)     Coordination   Romberg: negative  Finger to nose coordination: normal  Heel to shin coordination: normal  Tandem walking coordination: abnormal     Tremor   Resting tremor: absent  Intention tremor: present (very minimal, intermittent, kinetic tremor both hands, no resting tremor.)  Action tremor: absent     Reflexes   Right brachioradialis: 2+  Left brachioradialis: 2+  Right biceps: 2+  Left biceps: 2+  Right triceps: 2+  Left triceps: 2+  Right patellar: 2+  Left patellar: 2+  Right achilles: 2+  Left achilles: 2+  Right : 2+  Left : 2+  Right plantar: normal  Left plantar: normal  Right Loyola: absent  Left Loyola: absent  Right ankle clonus: absent  Left ankle clonus: absent  Right pendular knee jerk: absent  Left pendular knee jerk: absent  Normal " finger and heel tapping bilaterally. Rises slowly from chair, some mild shuffling but also guarded in gait d/t fear of falls and balance. Normal arm swing.       Physical Exam  Neck: Muscular tenderness present. No spinous process tenderness present. No neck rigidity. Decreased range of motion present. No edema and no erythema present.   Neurological: She is oriented to person, place, and time. She has an abnormal Tandem Gait Test. She has a normal Finger-Nose-Finger Test, a normal Heel to Florian Test and a normal Romberg Test.   Reflex Scores:       Tricep reflexes are 2+ on the right side and 2+ on the left side.       Bicep reflexes are 2+ on the right side and 2+ on the left side.       Brachioradialis reflexes are 2+ on the right side and 2+ on the left side.       Patellar reflexes are 2+ on the right side and 2+ on the left side.       Achilles reflexes are 2+ on the right side and 2+ on the left side.  Psychiatric: Her speech is normal and behavior is normal. Judgment and thought content normal. Her mood appears anxious. Cognition and memory are normal.     Assessment/Plan:       Juains was seen today for gait problem and peripheral neuropathy.    Diagnoses and all orders for this visit:    Cerebral microvascular disease  -     aspirin 81 MG EC tablet; Take 1 tablet by mouth Daily.    Neuropathic pain  -     gabapentin (NEURONTIN) 100 MG capsule; Take 1 capsule by mouth 3 (Three) Times a Day As Needed (neuropathy pain).    DDD (degenerative disc disease), cervical  Comments:  continue with PT, improving    DDD (degenerative disc disease), lumbar  Comments:  continue with PT, improving    DDD (degenerative disc disease), thoracic  Comments:  continue with PT, improving    Gait abnormality  Comments:  continue with PT, improving, using cane    Essential tremor  Comments:  monitor for now. could take gabapentin daily.     Chronic pain of multiple joints  Comments:  seeing Rheumatology in [address] on  prednisone, doing better           Aspirin 81mg daily with HTN with CVD for stroke/MI prevention. Reviewed MRI C spine. Wants to continue PT for now. She is showing improvement. Will monitor tremors. Consider taking gabapentin daily to help with tremors and neuropathy. Would wait on primidone due to lethargy and SEs. Would avoid propranolol with CCB. Would avoid topamax with her age. F/U in 4 months or sooner if needed.  Reviewed medications, potential side effects and signs and symptoms to report. Discussed risk versus benefits of treatment plan with patient and/or family-including medications, labs and radiology that may be ordered. Addressed questions and concerns during visit. Patient and/or family verbalized understanding and agree with plan.    AS THE PROVIDER, I PERSONALLY WORE PPE DURING ENTIRE FACE TO FACE ENCOUNTER IN CLINIC WITH THE PATIENT. PATIENT ALSO WORE PPE DURING ENTIRE FACE TO FACE ENCOUNTER EXCEPT FOR A MAX OF 30 SECONDS DURING NEUROLOGICAL EVALUATION OF CRANIAL NERVES AND THEN MASK WAS PLACED BACK OVER PATIENT FACE FOR REMAINDER OF VISIT. I WASHED MY HANDS BEFORE AND AFTER VISIT.    During this visit the following were done:  Labs Reviewed []    Labs Ordered []    Radiology Reports Reviewed [x]    Radiology Ordered []    PCP Records Reviewed []    Referring Provider Records Reviewed []    ER Records Reviewed []    Hospital Records Reviewed []    History Obtained From Family []    Radiology Images Reviewed [x]    Other Reviewed [x] PT notes   Records Requested []      Dianna Santamaria, JUDITH  9/3/2020

## 2020-09-25 ENCOUNTER — HOSPITAL ENCOUNTER (OUTPATIENT)
Dept: MAMMOGRAPHY | Facility: HOSPITAL | Age: 70
Discharge: HOME | End: 2020-09-25
Admitting: OBSTETRICS & GYNECOLOGY

## 2020-09-25 DIAGNOSIS — Z12.31 VISIT FOR SCREENING MAMMOGRAM: ICD-10-CM

## 2020-09-25 PROCEDURE — 77067 SCR MAMMO BI INCL CAD: CPT

## 2020-09-25 PROCEDURE — 77063 BREAST TOMOSYNTHESIS BI: CPT

## 2020-09-25 PROCEDURE — 77067 SCR MAMMO BI INCL CAD: CPT | Performed by: RADIOLOGY

## 2020-09-25 PROCEDURE — 77063 BREAST TOMOSYNTHESIS BI: CPT | Performed by: RADIOLOGY

## 2020-10-12 ENCOUNTER — HOSPITAL ENCOUNTER (OUTPATIENT)
Dept: MAMMOGRAPHY | Facility: HOSPITAL | Age: 70
Discharge: HOME | End: 2020-10-12

## 2020-10-12 DIAGNOSIS — Z12.31 VISIT FOR SCREENING MAMMOGRAM: ICD-10-CM

## 2020-12-11 ENCOUNTER — OFFICE VISIT (OUTPATIENT)
Dept: OBSTETRICS AND GYNECOLOGY | Facility: CLINIC | Age: 70
End: 2020-12-11

## 2020-12-11 VITALS
WEIGHT: 217.8 LBS | BODY MASS INDEX: 43.91 KG/M2 | HEIGHT: 59 IN | SYSTOLIC BLOOD PRESSURE: 132 MMHG | DIASTOLIC BLOOD PRESSURE: 84 MMHG

## 2020-12-11 DIAGNOSIS — Z01.419 ENCOUNTER FOR GYNECOLOGICAL EXAMINATION: Primary | ICD-10-CM

## 2020-12-11 PROCEDURE — 99397 PER PM REEVAL EST PAT 65+ YR: CPT | Performed by: OBSTETRICS & GYNECOLOGY

## 2020-12-11 NOTE — PROGRESS NOTES
GYN Annual Exam     CC - Here for annual exam.        HPI  Juanis Carpenter is a 69 y.o. female, , who presents for annual well woman exam.  She is postmenopausal.  Patient denies vaginal bleeding. ..  Patient reports problems with: none. There were no changes to her medical or surgical history since her last visit.. Partner Status: Marital Status: .  New Partners since last visit: no.      Additional OB/GYN History   Current contraception: contraceptive methods: Post menopausal status  Desires to: do not start contraception  On HRT? No  Last Pap :   Last Completed Pap Smear       Status Date      PAP SMEAR Done 2018 in [place] (negative)        History of abnormal Pap smear: no  Family history of uterine, colon, breast, or ovarian cancer: no  Performs monthly Self-Breast Exam: no  Last mammogram:   Last Completed Mammogram       Status Date      MAMMOGRAM Done 10/12/2020 MAMMO SCREENING TECHNICAL RECALL BILATERAL     Patient has more history with this topic...        Last colonoscopy: Pt unsure of last one  Last Completed Colonoscopy       Status Date      COLONOSCOPY No completions recorded        Last DEXA: On 2018 and results were Osteopenia  Exercises Regularly: yes  Feelings of Anxiety or Depression: yes - pt states it is controlled with medication      Tobacco Usage?: No   OB History        2    Para   2    Term   2            AB        Living           SAB        TAB        Ectopic        Molar        Multiple        Live Births                    Health Maintenance   Topic Date Due   • COLONOSCOPY  1950   • TDAP/TD VACCINES (1 - Tdap) 1969   • ZOSTER VACCINE (1 of 2) 2000   • Pneumococcal Vaccine 65+ (1 of 1 - PPSV23) 2015   • HEPATITIS C SCREENING  2019   • ANNUAL WELLNESS VISIT  2019   • INFLUENZA VACCINE  2020   • PAP SMEAR  2020   • MAMMOGRAM  10/12/2022       The additional following portions of the patient's  "history were reviewed and updated as appropriate: allergies, current medications, past family history, past medical history, past social history, past surgical history and problem list.    Review of Systems   Constitutional: Negative.    HENT: Negative.    Eyes: Negative.    Respiratory: Negative.    Cardiovascular: Negative.    Gastrointestinal: Negative.    Endocrine: Negative.    Genitourinary: Negative.    Musculoskeletal: Negative.    Skin: Negative.    Allergic/Immunologic: Negative.    Neurological: Negative.    Hematological: Negative.    Psychiatric/Behavioral: Negative.    All other systems reviewed and are negative.    All other systems reviewed and are negative.     I have reviewed and agree with the HPI, ROS, and historical information as entered above. Cm Falk MD    Objective   /84 (BP Location: Right arm, Patient Position: Sitting, Cuff Size: Adult)   Ht 149.9 cm (59\")   Wt 98.8 kg (217 lb 12.8 oz)   LMP  (LMP Unknown)   Breastfeeding No   BMI 43.99 kg/m²     Physical Exam  Vitals signs and nursing note reviewed. Exam conducted with a chaperone present.   Constitutional:       Appearance: She is well-developed.   HENT:      Head: Normocephalic and atraumatic.   Neck:      Musculoskeletal: Normal range of motion. No muscular tenderness.      Thyroid: No thyroid mass or thyromegaly.   Cardiovascular:      Rate and Rhythm: Normal rate and regular rhythm.      Heart sounds: No murmur.   Pulmonary:      Effort: Pulmonary effort is normal. No retractions.      Breath sounds: Normal breath sounds. No wheezing, rhonchi or rales.   Chest:      Chest wall: No mass or tenderness.      Breasts:         Right: Normal. No mass, nipple discharge, skin change or tenderness.         Left: Normal. No mass, nipple discharge, skin change or tenderness.   Abdominal:      General: Bowel sounds are normal.      Palpations: Abdomen is soft. Abdomen is not rigid. There is no mass.      Tenderness: There is " no abdominal tenderness. There is no guarding.      Hernia: No hernia is present. There is no hernia in the left inguinal area.   Genitourinary:     Labia:         Right: No rash, tenderness or lesion.         Left: No rash, tenderness or lesion.       Vagina: Normal. No vaginal discharge or lesions.      Cervix: No cervical motion tenderness, discharge, lesion or cervical bleeding.      Uterus: Normal. Not enlarged, not fixed and not tender.       Adnexa:         Right: No mass or tenderness.          Left: No mass or tenderness.        Rectum: No external hemorrhoid.   Neurological:      Mental Status: She is alert and oriented to person, place, and time.   Psychiatric:         Behavior: Behavior normal.            Assessment and Plan    Problem List Items Addressed This Visit     None      Visit Diagnoses     Encounter for gynecological examination    -  Primary    Relevant Orders    Pap IG, Rfx HPV ASCU          1. GYN annual well woman exam.   2. Ordered mammogram today.  3. Colonoscopy recommended.  4. RTC in 1 year or PRN with problems.    mC Falk MD  12/11/2020

## 2020-12-17 DIAGNOSIS — Z01.419 ENCOUNTER FOR GYNECOLOGICAL EXAMINATION: ICD-10-CM

## 2021-01-04 ENCOUNTER — OFFICE VISIT (OUTPATIENT)
Dept: NEUROLOGY | Facility: CLINIC | Age: 71
End: 2021-01-04

## 2021-01-04 VITALS
HEART RATE: 90 BPM | HEIGHT: 59 IN | TEMPERATURE: 97.5 F | WEIGHT: 223 LBS | OXYGEN SATURATION: 94 % | DIASTOLIC BLOOD PRESSURE: 78 MMHG | SYSTOLIC BLOOD PRESSURE: 122 MMHG | BODY MASS INDEX: 44.96 KG/M2

## 2021-01-04 DIAGNOSIS — M50.30 DDD (DEGENERATIVE DISC DISEASE), CERVICAL: ICD-10-CM

## 2021-01-04 DIAGNOSIS — R26.9 GAIT ABNORMALITY: ICD-10-CM

## 2021-01-04 DIAGNOSIS — M51.36 DDD (DEGENERATIVE DISC DISEASE), LUMBAR: ICD-10-CM

## 2021-01-04 DIAGNOSIS — G89.29 CHRONIC PAIN OF MULTIPLE JOINTS: Primary | ICD-10-CM

## 2021-01-04 DIAGNOSIS — R25.1 TREMOR: ICD-10-CM

## 2021-01-04 DIAGNOSIS — M51.34 DDD (DEGENERATIVE DISC DISEASE), THORACIC: ICD-10-CM

## 2021-01-04 DIAGNOSIS — M79.2 NEUROPATHIC PAIN: ICD-10-CM

## 2021-01-04 DIAGNOSIS — I67.89 CEREBRAL MICROVASCULAR DISEASE: ICD-10-CM

## 2021-01-04 DIAGNOSIS — M25.50 CHRONIC PAIN OF MULTIPLE JOINTS: Primary | ICD-10-CM

## 2021-01-04 PROBLEM — F45.8 BRUXISM: Status: ACTIVE | Noted: 2021-01-04

## 2021-01-04 PROCEDURE — 99214 OFFICE O/P EST MOD 30 MIN: CPT | Performed by: NURSE PRACTITIONER

## 2021-01-04 RX ORDER — GABAPENTIN 300 MG/1
300 CAPSULE ORAL 3 TIMES DAILY
Qty: 270 CAPSULE | Refills: 1 | Status: SHIPPED | OUTPATIENT
Start: 2021-01-04 | End: 2021-05-24

## 2021-01-04 NOTE — PROGRESS NOTES
Subjective:     Patient ID: Juanis Carpenter is a 70 y.o. female.    CC:   Chief Complaint   Patient presents with   • Gait Problem   • Peripheral Neuropathy   • Tremors       HPI:   History of Present Illness   This is a pleasant 70-year-old female who presents for 4-month follow-up on chronic difficulty with balance and gait over the past 2-1/2 to 3 years.  Since last visit she tells me she has had worsening tremor with the right hand being significant in the left hand being very minimal.  She tells me when she is holding her coffee, holding a utensil or even sitting to watch TV she and her  have noticed a tremor.  She is able to write relatively smoothly today.  Last visit she felt like this was worsened with writing but today she feels like it is with rest.  She does feel like this has progressed and worsened overall.  She denies difficulty with swallowing, loss of smell or constipation.  She has not had any falls.  She continues to use her cane.  She continues to do physical therapy.  She feels like her tremor is significantly bothersome.  She does have a shuffling gait which she has had for a few years but now she feels more like she gets stuck when she is walking cannot get her legs to move.  She also does feel like she has anxiety and fear of fall and so she does hesitate.    She also tells me for her gabapentin she was taking 100 mg PRN but now she has come off the prednisone which she was being treated for with chronic joint pain and this helped significantly but caused a lot of weight gain so she has stopped seeing rheumatology and stopped the prednisone.  Since being off the prednisone she has had a lot more pain and she has restarted her gabapentin 300 mg twice a day and would like a refill for the higher dose.  She tells me that increasing the gabapentin and taking it twice a day regularly for the past 6 weeks has not helped her tremor at all.    She also has chronic lower extremity swelling.   She has mentioned this to her PCP but she tells me she cannot take a higher dose of her furosemide since she already has some urinary incontinence.  She has been trying to keep her legs elevated when she can.  She does not consume a lot of salt that she knows of.  I have encouraged her to reach out to her PCP but also encouraged her to push her fluids, elevate her legs and obtain compression stockings over-the-counter.    Prior extensive neuro workup:  She had MRI of the C-spine without contrast completed on 6/23/2020 which does show multilevel degenerative disc changes from C4-C5, C5-C6 and C6-C7 with moderate canal stenosis from C4-C5 and mild canal stenosis from C5-C6 and C6-C7.  No significant cervical myelopathy.  No trauma.  No spinal cord lesions.  She has started going to [facility] for rehab and she has significantly improved with her gait and balance.  I was able to review their notes today and she really had just not completely recovered from her knee replacement surgery. Prior extensive workup: MRI of the brain without contrast and this was completed at [facility] on 9/19/2019 and per radiology report this is an unremarkable MRI of the head and imaging was also reviewed previously in office and does appear to have some age-appropriate chronic small vessel ischemic changes but no acute intracranial abnormalities. She has seen multiple providers Dr. [facility], her primary care provider, another orthopedic specialist as well as an orthopedic spine specialist.  She did undergo a nerve and muscle study of the lower extremities and this was completed on 8/9/2018 and showed a normal study of the bilateral lower extremities with no evidence of neuropathy or muscle damage.  She has had additional x-rays of her knee which showed no acute findings.  She also underwent an MRI of the lumbar spine on 12/28/2018 and that showed multilevel degenerative disc changes most  prominent at L4-L5 with some stenosis. She also underwent an MRI of the thoracic spine even more recently on 6/3/2019 and this showed some mild degenerative disc changes with moderate osteophytes from T11-T12 and some moderate foraminal narrowing.       The following portions of the patient's history were reviewed and updated as appropriate: allergies, current medications, past family history, past medical history, past social history, past surgical history and problem list.    Past Medical History:   Diagnosis Date   • Anxiety    • Arthritis    • Hypertension    • Sleep apnea        Past Surgical History:   Procedure Laterality Date   •  SECTION      x2   • CHOLECYSTECTOMY     • COLONOSCOPY     • DILATATION AND CURETTAGE     • LAPAROSCOPIC TUBAL LIGATION     • OOPHORECTOMY      one side removed   • TOTAL KNEE ARTHROPLASTY      right        Social History     Socioeconomic History   • Marital status:      Spouse name: Not on file   • Number of children: Not on file   • Years of education: Not on file   • Highest education level: Not on file   Tobacco Use   • Smoking status: Never Smoker   • Smokeless tobacco: Never Used   Substance and Sexual Activity   • Alcohol use: No     Frequency: Never   • Drug use: Never   • Sexual activity: Defer       Family History   Problem Relation Age of Onset   • Hypertension Mother    • Brain cancer Mother    • Dementia Father    • Stroke Father    • Cancer Father         skin cancer   • Hypertension Father    • Migraines Maternal Aunt    • Breast cancer Neg Hx    • Ovarian cancer Neg Hx         Review of Systems   Constitutional: Negative for chills, fatigue, fever and unexpected weight change.   HENT: Negative for ear pain, hearing loss, nosebleeds, rhinorrhea and sore throat.    Eyes: Negative for photophobia, pain, discharge, itching and visual disturbance.   Respiratory: Negative for cough, chest tightness, shortness of breath and wheezing.    Cardiovascular:  "Negative for chest pain, palpitations and leg swelling.   Gastrointestinal: Negative for abdominal pain, blood in stool, constipation, diarrhea, nausea and vomiting.   Genitourinary: Negative for dysuria, frequency, hematuria and urgency.   Musculoskeletal: Negative for arthralgias, back pain, gait problem, joint swelling, myalgias, neck pain and neck stiffness.   Skin: Negative for rash and wound.   Allergic/Immunologic: Negative for environmental allergies and food allergies.   Neurological: Negative for dizziness, tremors, seizures, syncope, speech difficulty, weakness, light-headedness, numbness and headaches.   Hematological: Negative for adenopathy. Does not bruise/bleed easily.   Psychiatric/Behavioral: Negative for agitation, confusion, decreased concentration, hallucinations, sleep disturbance and suicidal ideas. The patient is not nervous/anxious.    All other systems reviewed and are negative.       Objective:  /78   Pulse 90   Temp 97.5 °F (36.4 °C)   Ht 149.9 cm (59\")   Wt 101 kg (223 lb)   LMP  (LMP Unknown)   SpO2 94%   BMI 45.04 kg/m²     Neurologic Exam  Mental Status   Oriented to person, place, and time.   Registration: recalls 3 of 3 objects. Recall at 5 minutes: recalls 3 of 3 objects. Follows 3 step commands.   Attention: normal. Concentration: normal.   alert     Cranial Nerves   Cranial nerves II through XII intact.      Motor Exam   Muscle bulk: normal  Overall muscle tone: normal     Strength   Strength 5/5 except as noted.   Right strength: Decreased ROM Right hip/knee but no weakness  No cogwheel rigidity     Sensory Exam   Light touch normal.   Vibration normal.   Proprioception normal.   Pinprick normal.      Gait, Coordination, and Reflexes      Gait  Gait: wide-based (antalgic, slow gait w/ shuffling noted, declined since last visit, prefers to use cane for stability)     Coordination   Romberg: negative  Finger to nose coordination: normal  Heel to shin coordination: " normal  Tandem walking coordination: abnormal     Tremor   Resting tremor: present-mild right hand pill rolling tremor noted today, normal ET spiral  Intention tremor: present (very minimal, intermittent, kinetic tremor both hands, R>L)  Action tremor: absent     Reflexes   Right brachioradialis: 2+  Left brachioradialis: 2+  Right biceps: 2+  Left biceps: 2+  Right triceps: 2+  Left triceps: 2+  Right patellar: 2+  Left patellar: 2+  Right achilles: 2+  Left achilles: 2+  Right : 2+  Left : 2+  Right plantar: normal  Left plantar: normal  Right Loyola: absent  Left L[place]ola: absent  Right ankle clonus: absent  Left ankle clonus: absent  Right pendular knee jerk: absent  Left pendular knee jerk: absent  Some bradykinesia w/ right hand tapping, otherwise left hand and both feet have normal taps. Rises slowly from chair, some mild shuffling gait w/ turns but also guarded in gait d/t fear of falls and balance. Arm swing slight decreased today in right arm compared to prior visit. She is hesitant to walk without cane but walks about 6 feet w/o this.     Physical Exam  Neurological: She is oriented to person, place, and time. She has an abnormal Tandem Gait Test. She has a normal Finger-Nose-Finger Test, a normal Heel to Florian Test and a normal Romberg Test.   Reflex Scores:       Tricep reflexes are 2+ on the right side and 2+ on the left side.       Bicep reflexes are 2+ on the right side and 2+ on the left side.       Brachioradialis reflexes are 2+ on the right side and 2+ on the left side.       Patellar reflexes are 2+ on the right side and 2+ on the left side.       Achilles reflexes are 2+ on the right side and 2+ on the left side.  Psychiatric: Her speech is normal and behavior is normal. Judgment and thought content normal. Her mood appears anxious. Cognition and memory are normal.     Assessment/Plan:       Diagnoses and all orders for this visit:    1. Chronic pain of multiple joints (Primary)  -      gabapentin (NEURONTIN) 300 MG capsule; Take 1 capsule by mouth 3 (Three) Times a Day.  Dispense: 270 capsule; Refill: 1    2. Neuropathic pain  -     gabapentin (NEURONTIN) 300 MG capsule; Take 1 capsule by mouth 3 (Three) Times a Day.  Dispense: 270 capsule; Refill: 1    3. DDD (degenerative disc disease), cervical  -     gabapentin (NEURONTIN) 300 MG capsule; Take 1 capsule by mouth 3 (Three) Times a Day.  Dispense: 270 capsule; Refill: 1    4. DDD (degenerative disc disease), thoracic  -     gabapentin (NEURONTIN) 300 MG capsule; Take 1 capsule by mouth 3 (Three) Times a Day.  Dispense: 270 capsule; Refill: 1    5. DDD (degenerative disc disease), lumbar  -     gabapentin (NEURONTIN) 300 MG capsule; Take 1 capsule by mouth 3 (Three) Times a Day.  Dispense: 270 capsule; Refill: 1    6. Tremor  Comments:  PD vs ET, some pill rolling features w/ shuffling, multifactorial, will trial low dose sinemet w/ recheck in 6 wks, cont. PT & cane  Orders:  -     carbidopa-levodopa (SINEMET)  MG per tablet; Take 1 tab qhs x 3 days then 1 tab bid x 3 days then 1 tab TID and continue  Dispense: 90 tablet; Refill: 1    7. Gait abnormality  Comments:  PD vs ET, some pill rolling features w/ shuffling, multifactorial, will trial low dose sinemet w/ recheck in 6 wks, cont. PT & cane  Orders:  -     carbidopa-levodopa (SINEMET)  MG per tablet; Take 1 tab qhs x 3 days then 1 tab bid x 3 days then 1 tab TID and continue  Dispense: 90 tablet; Refill: 1    8. Cerebral microvascular disease  Comments:  continue aspirin           I have discussed in detail her tremor as well as gait abnormalities.  We have discussed that there is no definitive PD but she does have several exam findings and would be worth a trial of carbidopa-levodopa to see if this improves her tremor and gait. She is agreeable and accepts risks of this trial. We will increase her gabapentin to 2-3 times a day for her pain. She will continue PT and cane. She  will f/u in clinic in 6 weeks for re-eval of gait and tremors. Reviewed medications, potential side effects and signs and symptoms to report. Discussed risk versus benefits of treatment plan with patient and/or family-including medications, labs and radiology that may be ordered. Addressed questions and concerns during visit. Patient and/or family verbalized understanding and agree with plan.    AS THE PROVIDER, I PERSONALLY WORE PPE DURING ENTIRE FACE TO FACE ENCOUNTER IN CLINIC WITH THE PATIENT. PATIENT ALSO WORE PPE DURING ENTIRE FACE TO FACE ENCOUNTER EXCEPT FOR A MAX OF 30 SECONDS DURING NEUROLOGICAL EVALUATION OF CRANIAL NERVES AND THEN MASK WAS PLACED BACK OVER PATIENT FACE FOR REMAINDER OF VISIT. I WASHED MY HANDS BEFORE AND AFTER VISIT.    As part of this patient's treatment plan I am prescribing controlled substances. The patient has been made aware of appropriate use of such medications, including potential risk of somnolence, limited ability to drive and/or work safely, and potential for dependence or overdose. It has also been made clear that these medications are for use by the patient only, without concomitant use of alcohol or other substances unless prescribed. Keep out of reach of children.  [facility]egino report has been reviewed. If this is going to be prescribed long term, [facility] Controlled Substance Agreement Contract has also been read and signed by patient and myself.    Dianna Santamaria, APRN  1/4/2021

## 2021-01-26 ENCOUNTER — TELEPHONE (OUTPATIENT)
Dept: NEUROLOGY | Facility: CLINIC | Age: 71
End: 2021-01-26

## 2021-01-26 NOTE — TELEPHONE ENCOUNTER
Pt called in stating she is not able to take carbidopa-levodopa TID, states she does not eat lunch. States her other doctor had her on cymbalta and told her long term use of cymbalta can cause tremor. She wants to know if Dianna Santamaria is able to change her cymbalta or if she needs to change the carbidopa-levodopa. States she just doesn't know what to do.     Please advise.     [phone]

## 2021-01-27 DIAGNOSIS — R25.1 TREMOR: ICD-10-CM

## 2021-01-27 DIAGNOSIS — R26.9 GAIT ABNORMALITY: ICD-10-CM

## 2021-01-27 NOTE — TELEPHONE ENCOUNTER
She can take the carbidopa levodopa twice a day instead of 3 times a day.  I would not recommend any changes with her duloxetine (Cymbalta).  We will follow up in clinic and see how she is doing as scheduled.  Thanks, JUDITH Aguila.

## 2021-02-22 ENCOUNTER — OFFICE VISIT (OUTPATIENT)
Dept: NEUROLOGY | Facility: CLINIC | Age: 71
End: 2021-02-22

## 2021-02-22 VITALS
TEMPERATURE: 97.4 F | WEIGHT: 218 LBS | DIASTOLIC BLOOD PRESSURE: 74 MMHG | BODY MASS INDEX: 43.95 KG/M2 | SYSTOLIC BLOOD PRESSURE: 126 MMHG | HEIGHT: 59 IN | HEART RATE: 82 BPM | OXYGEN SATURATION: 96 %

## 2021-02-22 DIAGNOSIS — R26.9 GAIT ABNORMALITY: Primary | ICD-10-CM

## 2021-02-22 DIAGNOSIS — R25.1 TREMOR: ICD-10-CM

## 2021-02-22 DIAGNOSIS — M79.2 NEUROPATHIC PAIN: ICD-10-CM

## 2021-02-22 DIAGNOSIS — R27.0 ATAXIA: ICD-10-CM

## 2021-02-22 PROCEDURE — 99214 OFFICE O/P EST MOD 30 MIN: CPT | Performed by: NURSE PRACTITIONER

## 2021-02-22 RX ORDER — CARBIDOPA AND LEVODOPA 50; 200 MG/1; MG/1
1 TABLET, EXTENDED RELEASE ORAL 2 TIMES DAILY
Qty: 60 TABLET | Refills: 3 | Status: SHIPPED | OUTPATIENT
Start: 2021-02-22 | End: 2021-05-24

## 2021-02-22 NOTE — PROGRESS NOTES
Subjective:     Patient ID: Juanis Carpenter is a 70 y.o. female.    CC:   Chief Complaint   Patient presents with   • Tremors   • Gait Problem       HPI:   History of Present Illness   This is a pleasant 70-year-old female who presents for 6 week follow-up on chronic difficulty with balance and gait over since around [id number]. Last visit we started a trial of low dose Sinemet 25-100mg TID.This was for her chronic gait difficulties, resting right hand tremor for possible Parkinsonism. She tells me she has seen a significant amount of improvement in the ease of her gait and movement with less shuffling, but she has not seen a change in her tremors. She tells me sometimes her right hand tremor is so severe she has to tuck it in the side of her recliner when she is watching television. She has not been able to take the midday dosing due to not eating lunch and feeling nausea. She is wondering about an increase. Tremor with the right hand is moderate and in the left hand very minimal.  She tells me when she is holding her coffee, holding a utensil or even sitting to watch TV she and her  have noticed a tremor.  She is able to write relatively smoothly. She denies difficulty with swallowing, loss of smell or constipation.  She has not had any falls.  She continues to use her cane (previously required walker-has improved with PT).  She continues to do physical therapy.  She feels like her tremor is significantly bothersome.  She does have a shuffling gait which she has had for a few years but now she feels more like she gets stuck when she is walking cannot get her legs to move.  She also does feel like she has anxiety and fear of fall and so she does hesitate. She is completing her last month of PT now. She also asks about switching cymbalta to a different SSRI/SNRI due to possible tremor cause and I recommended with her chronic pain for now she continue the cymbalta which has helped her pain.     Last visit  we also increased her gabapentin to 300mg TID and she is tolerating this well and it has helped her pain be manageable. Has had chronic joint pain followed by Rheumatology previously and worsened with being off prednisone.      Prior extensive neuro workup:  She had MRI of the C-spine without contrast completed on 6/23/2020 which does show multilevel degenerative disc changes from C4-C5, C5-C6 and C6-C7 with moderate canal stenosis from C4-C5 and mild canal stenosis from C5-C6 and C6-C7.  No significant cervical myelopathy.  No trauma.  No spinal cord lesions.  She has started going to [facility] for rehab and she has significantly improved with her gait and balance.  I was able to review their notes today and she really had just not completely recovered from her knee replacement surgery. Prior extensive workup: MRI of the brain without contrast and this was completed at [facility] on 9/19/2019 and per radiology report this is an unremarkable MRI of the head and imaging was also reviewed previously in office and does appear to have some age-appropriate chronic small vessel ischemic changes but no acute intracranial abnormalities. She has seen multiple providers Dr. [facility], her primary care provider, another orthopedic specialist as well as an orthopedic spine specialist.  She did undergo a nerve and muscle study of the lower extremities and this was completed on 8/9/2018 and showed a normal study of the bilateral lower extremities with no evidence of neuropathy or muscle damage.  She has had additional x-rays of her knee which showed no acute findings.  She also underwent an MRI of the lumbar spine on 12/28/2018 and that showed multilevel degenerative disc changes most prominent at L4-L5 with some stenosis. She also underwent an MRI of the thoracic spine even more recently on 6/3/2019 and this showed some mild degenerative disc changes with moderate osteophytes from  T11-T12 and some moderate foraminal narrowing.        The following portions of the patient's history were reviewed and updated as appropriate: allergies, current medications, past family history, past medical history, past social history, past surgical history and problem list.    Past Medical History:   Diagnosis Date   • Anxiety    • Arthritis    • Hypertension    • Sleep apnea        Past Surgical History:   Procedure Laterality Date   •  SECTION      x2   • CHOLECYSTECTOMY     • COLONOSCOPY     • DILATATION AND CURETTAGE     • LAPAROSCOPIC TUBAL LIGATION     • OOPHORECTOMY      one side removed   • TOTAL KNEE ARTHROPLASTY      right        Social History     Socioeconomic History   • Marital status:      Spouse name: Not on file   • Number of children: Not on file   • Years of education: Not on file   • Highest education level: Not on file   Tobacco Use   • Smoking status: Never Smoker   • Smokeless tobacco: Never Used   Substance and Sexual Activity   • Alcohol use: No     Frequency: Never   • Drug use: Never   • Sexual activity: Defer       Family History   Problem Relation Age of Onset   • Hypertension Mother    • Brain cancer Mother    • Dementia Father    • Stroke Father    • Cancer Father         skin cancer   • Hypertension Father    • Migraines Maternal Aunt    • Breast cancer Neg Hx    • Ovarian cancer Neg Hx         Review of Systems   Constitutional: Negative for chills, fatigue, fever and unexpected weight change.   HENT: Negative for ear pain, hearing loss, nosebleeds, rhinorrhea and sore throat.    Eyes: Negative for photophobia, pain, discharge, itching and visual disturbance.   Respiratory: Negative for cough, chest tightness, shortness of breath and wheezing.    Cardiovascular: Negative for chest pain, palpitations and leg swelling.   Gastrointestinal: Negative for abdominal pain, blood in stool, constipation, diarrhea, nausea and vomiting.   Genitourinary: Negative for dysuria,  "frequency, hematuria and urgency.   Musculoskeletal: Positive for arthralgias and gait problem. Negative for back pain, joint swelling, myalgias, neck pain and neck stiffness.   Skin: Negative for rash and wound.   Allergic/Immunologic: Negative for environmental allergies and food allergies.   Neurological: Positive for tremors. Negative for dizziness, seizures, syncope, speech difficulty, weakness, light-headedness, numbness and headaches.   Hematological: Negative for adenopathy. Does not bruise/bleed easily.   Psychiatric/Behavioral: Negative for agitation, confusion, decreased concentration, hallucinations, sleep disturbance and suicidal ideas. The patient is not nervous/anxious.    All other systems reviewed and are negative.       Objective:  /74   Pulse 82   Temp 97.4 °F (36.3 °C)   Ht 149.9 cm (59\")   Wt 98.9 kg (218 lb)   LMP  (LMP Unknown)   SpO2 96%   BMI 44.03 kg/m²     Neurologic Exam  Mental Status   Oriented to person, place, and time.   Registration: recalls 3 of 3 objects. Recall at 5 minutes: recalls 3 of 3 objects. Follows 3 step commands.   Attention: normal. Concentration: normal.   alert     Cranial Nerves   Cranial nerves II through XII intact.      Motor Exam   Muscle bulk: normal  Overall muscle tone: normal     Strength   Strength 5/5 except as noted.   Right strength: Decreased ROM Right hip/knee but no weakness  No cogwheel rigidity     Sensory Exam   Light touch normal.   Vibration normal.   Proprioception normal.   Pinprick normal.      Gait, Coordination, and Reflexes      Gait  Gait: wide-based (antalgic, slow gait w/ shuffling noted but improved, can walk without cane today in [place] but does have to hold onto wall a few times for balance)     Coordination   Romberg: negative  Finger to nose coordination: normal  Heel to shin coordination: normal  Tandem walking coordination: abnormal     Tremor   Resting tremor: present-mild right hand pill rolling tremor noted today, " normal ET spiral  Intention tremor: present (very minimal, intermittent, kinetic tremor both hands, R>L)  Action tremor: absent     Reflexes   Right brachioradialis: 2+  Left brachioradialis: 2+  Right biceps: 2+  Left biceps: 2+  Right triceps: 2+  Left triceps: 2+  Right patellar: 2+  Left patellar: 2+  Right achilles: 2+  Left achilles: 2+  Right : 2+  Left : 2+  Right plantar: normal  Left plantar: normal  Right L[place]ola: absent  Left [place]: absent  Right ankle clonus: absent  Left ankle clonus: absent  Right pendular knee jerk: absent  Left pendular knee jerk: absent  Some bradykinesia w/ right hand tapping, otherwise left hand and both feet have normal taps. Rises slowly from chair, some mild shuffling gait w/ turns but also guarded in gait. Arm swing slight decreased today in right arm. Her gait shows improvement and she is able to  her feet more today.     Physical Exam  Neurological: She is oriented to person, place, and time. She has an abnormal Tandem Gait Test (chronic). She has a normal Finger-Nose-Finger Test, a normal Heel to Florian Test and a normal Romberg Test.   Reflex Scores:       Tricep reflexes are 2+ on the right side and 2+ on the left side.       Bicep reflexes are 2+ on the right side and 2+ on the left side.       Brachioradialis reflexes are 2+ on the right side and 2+ on the left side.       Patellar reflexes are 2+ on the right side and 2+ on the left side.       Achilles reflexes are 2+ on the right side and 2+ on the left side.  Psychiatric: Her speech is normal and behavior is normal. Judgment and thought content normal. Her mood appears anxious. Cognition and memory are normal.     Assessment/Plan:       Diagnoses and all orders for this visit:    1. Gait abnormality (Primary)  Comments:  finish PT this month, continue with cane.  Orders:  -     carbidopa-levodopa CR (SINEMET CR)  MG per CR tablet; Take 1 tablet by mouth 2 (Two) Times a Day.  Dispense: 60  tablet; Refill: 3  -     Ambulatory Referral to Neurology    2. Ataxia  -     carbidopa-levodopa CR (SINEMET CR)  MG per CR tablet; Take 1 tablet by mouth 2 (Two) Times a Day.  Dispense: 60 tablet; Refill: 3    3. Tremor  Comments:  right hand tremor at rest and w/ movement. consult with [facility] ET vs PD. responding to sinemet-change to CR BID dosing  Orders:  -     carbidopa-levodopa CR (SINEMET CR)  MG per CR tablet; Take 1 tablet by mouth 2 (Two) Times a Day.  Dispense: 60 tablet; Refill: 3  -     Ambulatory Referral to Neurology    4. Neuropathic pain  Comments:  CONTINUE GABAPENTIN 300MG TAKING BID           She has had some positive response to low dose carbidopa-levodopa but due to nausea she cannot tolerate TID dosing. We will increase and switch to sinemet CR and see how she does. I have asked her to call us in the next few weeks if she has trouble tolerating the medication for a lower dosage. I have also recommended with her tremors and gait difficulties to have a definitive diagnosis and would appreciate their expertise and opinion as to whether she should continue Sinemet and a diagnosis of PD or other-I explained it could take 6-9 months for an appointment and she understands. She will continue the gabapentin. We will have her f/u in our clinic in 3 months or sooner if needed.    Reviewed medications, potential side effects and signs and symptoms to report. Discussed risk versus benefits of treatment plan with patient and/or family-including medications, labs and radiology that may be ordered. Addressed questions and concerns during visit. Patient and/or family verbalized understanding and agree with plan.    AS THE PROVIDER, I PERSONALLY WORE PPE DURING ENTIRE FACE TO FACE ENCOUNTER IN CLINIC WITH THE PATIENT. PATIENT ALSO WORE PPE DURING ENTIRE FACE TO FACE ENCOUNTER EXCEPT FOR A MAX OF 30 SECONDS DURING NEUROLOGICAL EVALUATION OF CRANIAL NERVES AND THEN MASK WAS PLACED BACK  OVER PATIENT FACE FOR REMAINDER OF VISIT. I WASHED MY HANDS BEFORE AND AFTER VISIT.    As part of this patient's treatment plan I am prescribing controlled substances. The patient has been made aware of appropriate use of such medications, including potential risk of somnolence, limited ability to drive and/or work safely, and potential for dependence or overdose. It has also been made clear that these medications are for use by the patient only, without concomitant use of alcohol or other substances unless prescribed. Keep out of reach of children.  [facility]egino report has been reviewed. If this is going to be prescribed long term, [facility] [place] has also been read and signed by patient and myself.     Dianna Santamaria, APRN  2/23/2021

## 2021-03-01 ENCOUNTER — TELEPHONE (OUTPATIENT)
Dept: NEUROLOGY | Facility: CLINIC | Age: 71
End: 2021-03-01

## 2021-03-01 NOTE — TELEPHONE ENCOUNTER
Provider: JUDITH HAAS  Caller: LOR HONG  Relationship to Patient: SELF    Reason for Call: PT CALLING CHECKING ON THE STATUS OF THE APPT FOR THE [facility]EMENT [facility] AT .    PLEASE CALL HER BACK -[phone]

## 2021-03-01 NOTE — TELEPHONE ENCOUNTER
I CALLED AND LET PT KNOW THAT  SCHEDULES OUT PRETTY FAR AND NOT TO EXPECT IN ANY SOONER THAN 2 WEEKS OUT.

## 2021-05-24 ENCOUNTER — OFFICE VISIT (OUTPATIENT)
Dept: NEUROLOGY | Facility: CLINIC | Age: 71
End: 2021-05-24

## 2021-05-24 VITALS
HEIGHT: 59 IN | OXYGEN SATURATION: 94 % | SYSTOLIC BLOOD PRESSURE: 122 MMHG | TEMPERATURE: 98 F | WEIGHT: 219 LBS | HEART RATE: 89 BPM | DIASTOLIC BLOOD PRESSURE: 72 MMHG | BODY MASS INDEX: 44.15 KG/M2

## 2021-05-24 DIAGNOSIS — M79.2 NEUROPATHIC PAIN: ICD-10-CM

## 2021-05-24 DIAGNOSIS — M25.50 CHRONIC PAIN OF MULTIPLE JOINTS: ICD-10-CM

## 2021-05-24 DIAGNOSIS — R25.1 TREMOR: Primary | ICD-10-CM

## 2021-05-24 DIAGNOSIS — R26.9 GAIT ABNORMALITY: ICD-10-CM

## 2021-05-24 DIAGNOSIS — M50.30 DDD (DEGENERATIVE DISC DISEASE), CERVICAL: ICD-10-CM

## 2021-05-24 DIAGNOSIS — M51.36 DDD (DEGENERATIVE DISC DISEASE), LUMBAR: ICD-10-CM

## 2021-05-24 DIAGNOSIS — M51.34 DDD (DEGENERATIVE DISC DISEASE), THORACIC: ICD-10-CM

## 2021-05-24 DIAGNOSIS — G89.29 CHRONIC PAIN OF MULTIPLE JOINTS: ICD-10-CM

## 2021-05-24 PROCEDURE — 99214 OFFICE O/P EST MOD 30 MIN: CPT | Performed by: NURSE PRACTITIONER

## 2021-05-24 RX ORDER — GABAPENTIN 300 MG/1
300 CAPSULE ORAL 2 TIMES DAILY
Qty: 180 CAPSULE | Refills: 1 | Status: SHIPPED | OUTPATIENT
Start: 2021-05-24 | End: 2021-12-14

## 2021-05-24 NOTE — PROGRESS NOTES
"Subjective:     Patient ID: Juanis Carpenter is a 70 y.o. female.    CC:   Chief Complaint   Patient presents with   • Tremors       HPI:   History of Present Illness   This is a pleasant 70-year-old female who presents for 3 month follow-up on chronic difficulty with balance and gait over since around [id number]. We started a trial of sinemet for chronic gait difficulties, resting right hand tremor & she felt she significantly improved but there was no definite parkinson's. She was on Cymbalta 60mg and tells me since last visit she went off the Cymbalta \"cold turkey\" per her PCP who told her that her tremors is essential and to call her family members and she was able to be reminded by a cousin that her paternal aunt had a tremor but she did not remember. Tremor with the right hand is moderate and in the left hand very minimal; she is improved significantly and wants to wean off sinemet. The shuffling gait continues. She is able to write relatively smoothly. She denies difficulty with swallowing, loss of smell or constipation.  She has not had any falls.  She continues to use her cane (previously required walker-has improved with PT).  She continues to do physical therapy.  Her tremor is better off Cymbalta but gait is unchanged.  She does have a shuffling gait which she has had for a few years.  She also does feel like she has anxiety and fear of fall and so she does hesitate. She has completed PT. She is taking gabapentin 300mg BID for chronic pain and this is helping. She reports she was on Cymbalta about 2.5 years prior.    [facility]2021 evaluation scheduled. She had a cardiac risk assessment screening with negative carotid duplex, normal ROMELIA BLE, normal Osteoporosis screening, Moderate CV risk score of 11. She has tried to diet and lose weight but has been unsuccessful. She has trouble exercising due to her pain and gait issues.     Prior extensive neuro workup:  She " had MRI of the C-spine without contrast completed on 6/23/2020 which does show multilevel degenerative disc changes from C4-C5, C5-C6 and C6-C7 with moderate canal stenosis from C4-C5 and mild canal stenosis from C5-C6 and C6-C7.  No significant cervical myelopathy.  No trauma.  No spinal cord lesions.  She has started going to [facility] for rehab and she has significantly improved with her gait and balance.  I was able to review their notes today and she really had just not completely recovered from her knee replacement surgery. Prior extensive workup: MRI of the brain without contrast and this was completed at [facility] on [facility]/19/2019 and per radiology report this is an unremarkable MRI of the head and imaging was also reviewed previously in office and does appear to have some age-appropriate chronic small vessel ischemic changes but no acute intracranial abnormalities. She has seen multiple providers Dr. [facility], her primary care provider, another orthopedic specialist as well as an orthopedic spine specialist.  She did undergo a nerve and muscle study of the lower extremities and this was completed on 8/9/2018 and showed a normal study of the bilateral lower extremities with no evidence of neuropathy or muscle damage.  She has had additional x-rays of her knee which showed no acute findings.  She also underwent an MRI of the lumbar spine on 12/28/2018 and that showed multilevel degenerative disc changes most prominent at L4-L5 with some stenosis. She also underwent an MRI of the thoracic spine even more recently on 6/3/2019 and this showed some mild degenerative disc changes with moderate osteophytes from T11-T12 and some moderate foraminal narrowing.      The following portions of the patient's history were reviewed and updated as appropriate: allergies, current medications, past family history, past medical history, past social history, past surgical history and  problem list.    Past Medical History:   Diagnosis Date   • Anxiety    • Arthritis    • Hypertension    • Sleep apnea        Past Surgical History:   Procedure Laterality Date   •  SECTION      x2   • CHOLECYSTECTOMY     • COLONOSCOPY     • DILATATION AND CURETTAGE     • LAPAROSCOPIC TUBAL LIGATION     • OOPHORECTOMY      one side removed   • TOTAL KNEE ARTHROPLASTY      right        Social History     Socioeconomic History   • Marital status:      Spouse name: Not on file   • Number of children: Not on file   • Years of education: Not on file   • Highest education level: Not on file   Tobacco Use   • Smoking status: Never Smoker   • Smokeless tobacco: Never Used   Vaping Use   • Vaping Use: Never used   Substance and Sexual Activity   • Alcohol use: No   • Drug use: Never   • Sexual activity: Defer       Family History   Problem Relation Age of Onset   • Hypertension Mother    • Brain cancer Mother    • Dementia Father    • Stroke Father    • Cancer Father         skin cancer   • Hypertension Father    • Migraines Maternal Aunt    • Breast cancer Neg Hx    • Ovarian cancer Neg Hx         Review of Systems   Constitutional: Negative for chills, fatigue, fever and unexpected weight change.   HENT: Negative for ear pain, hearing loss, nosebleeds, rhinorrhea and sore throat.    Eyes: Negative for photophobia, pain, discharge, itching and visual disturbance.   Respiratory: Negative for cough, chest tightness, shortness of breath and wheezing.    Cardiovascular: Negative for chest pain, palpitations and leg swelling.   Gastrointestinal: Negative for abdominal pain, blood in stool, constipation, diarrhea, nausea and vomiting.   Genitourinary: Negative for dysuria, frequency, hematuria and urgency.   Musculoskeletal: Negative for arthralgias, back pain, gait problem, joint swelling, myalgias, neck pain and neck stiffness.   Skin: Negative for rash and wound.   Allergic/Immunologic: Negative for environmental  "allergies and food allergies.   Neurological: Negative for dizziness, tremors, seizures, syncope, speech difficulty, weakness, light-headedness, numbness and headaches.   Hematological: Negative for adenopathy. Does not bruise/bleed easily.   Psychiatric/Behavioral: Negative for agitation, confusion, decreased concentration, hallucinations, sleep disturbance and suicidal ideas. The patient is not nervous/anxious.    All other systems reviewed and are negative.       Objective:  /72   Pulse 89   Temp 98 °F (36.7 °C)   Ht 149.9 cm (59\")   Wt 99.3 kg (219 lb)   LMP  (LMP Unknown)   SpO2 94%   BMI 44.23 kg/m²     Neurologic Exam  Mental Status   Oriented to person, place, and time.   Registration: recalls 3 of 3 objects. Recall at 5 minutes: recalls 3 of 3 objects. Follows 3 step commands.   Attention: normal. Concentration: normal.   alert     Cranial Nerves   Cranial nerves II through XII intact.      Motor Exam   Muscle bulk: normal  Overall muscle tone: normal     Strength   Strength 5/5 except as noted.   Right strength: Decreased ROM Right hip/knee but no weakness  No cogwheel rigidity     Sensory Exam   Light touch normal.   Vibration normal.   Proprioception normal.   Pinprick normal.      Gait, Coordination, and Reflexes      Gait  Gait: wide-based (antalgic, slow gait w/ shuffling, able to walk in bolden w/o cane-stable)     Coordination   Romberg: negative  Finger to nose coordination: normal  Heel to shin coordination: normal  Tandem walking coordination: abnormal     Tremor   Resting tremor: absent today  Intention tremor: present (very minimal, intermittent, kinetic tremor both hands, R>L)  Action tremor: absent     Reflexes   Right brachioradialis: 2+  Left brachioradialis: 2+  Right biceps: 2+  Left biceps: 2+  Right triceps: 2+  Left triceps: 2+  Right patellar: 2+  Left patellar: 2+  Right achilles: 2+  Left achilles: 2+  Right : 2+  Left : 2+  Right plantar: normal  Left plantar: " normal  Right Loyola: absent  Left Loyola: absent  Right ankle clonus: absent  Left ankle clonus: absent  Right pendular knee jerk: absent  Left pendular knee jerk: absent  Some bradykinesia w/ right hand tapping, otherwise left hand and both feet have normal taps. Rises slowly from chair, some mild shuffling gait w/ turns but also guarded in gait. Arm swing good today. Antalgia d/t chronic knee pains.     Physical Exam  Neurological: She is oriented to person, place, and time. She has an abnormal Tandem Gait Test (chronic). She has a normal Finger-Nose-Finger Test, a normal Heel to Florian Test and a normal Romberg Test.   Reflex Scores:       Tricep reflexes are 2+ on the right side and 2+ on the left side.       Bicep reflexes are 2+ on the right side and 2+ on the left side.       Brachioradialis reflexes are 2+ on the right side and 2+ on the left side.       Patellar reflexes are 2+ on the right side and 2+ on the left side.       Achilles reflexes are 2+ on the right side and 2+ on the left side.  Psychiatric: Her speech is normal and behavior is normal. Judgment and thought content normal. Her mood appears anxious. Cognition and memory are normal.     Assessment/Plan:       Diagnoses and all orders for this visit:    1. Tremor (Primary)  Comments:  BETTER OFF CYMBALTA D/C BY PCP, WILL TRY TO WEAN SINEMET SLOWLY & RE-EVAL    2. Gait abnormality  Comments:  IMPROVED W/ AGGRESSIVE PT, CONTINUE CANE.    3. Chronic pain of multiple joints  -     gabapentin (NEURONTIN) 300 MG capsule; Take 1 capsule by mouth 2 (two) times a day.  Dispense: 180 capsule; Refill: 1    4. Neuropathic pain  -     gabapentin (NEURONTIN) 300 MG capsule; Take 1 capsule by mouth 2 (two) times a day.  Dispense: 180 capsule; Refill: 1    5. DDD (degenerative disc disease), cervical  -     gabapentin (NEURONTIN) 300 MG capsule; Take 1 capsule by mouth 2 (two) times a day.  Dispense: 180 capsule; Refill: 1    6. DDD (degenerative disc disease),  thoracic  -     gabapentin (NEURONTIN) 300 MG capsule; Take 1 capsule by mouth 2 (two) times a day.  Dispense: 180 capsule; Refill: 1    7. DDD (degenerative disc disease), lumbar  -     gabapentin (NEURONTIN) 300 MG capsule; Take 1 capsule by mouth 2 (two) times a day.  Dispense: 180 capsule; Refill: 1         Carbidopa-levodopa 25-100mg take 1 tablet by mouth twice a day for 2 weeks then 1 tablet by mouth daily x 2 weeks and then stop. If gait or tremors worsens, call us.  Tremor does seem to be improved off Cymbalta. Her gait is essentially unchanged. She would like to wean off the sinemet. I have provided a weaning schedule. She will f/u in 3 months or sooner if needed. I think Bariatric surgery eval will be excellent. Call if any issues with weaning or worsening symptoms.  Reviewed medications, potential side effects and signs and symptoms to report. Discussed risk versus benefits of treatment plan with patient and/or family-including medications, labs and radiology that may be ordered. Addressed questions and concerns during visit. Patient and/or family verbalized understanding and agree with plan.    AS THE PROVIDER, I PERSONALLY WORE PPE DURING ENTIRE FACE TO FACE ENCOUNTER IN CLINIC WITH THE PATIENT. PATIENT ALSO WORE PPE DURING ENTIRE FACE TO FACE ENCOUNTER EXCEPT FOR A MAX OF 30 SECONDS DURING NEUROLOGICAL EVALUATION OF CRANIAL NERVES AND THEN MASK WAS PLACED BACK OVER PATIENT FACE FOR REMAINDER OF VISIT. I WASHED MY HANDS BEFORE AND AFTER VISIT.    As part of this patient's treatment plan I am prescribing controlled substances. The patient has been made aware of appropriate use of such medications, including potential risk of somnolence, limited ability to drive and/or work safely, and potential for dependence or overdose. It has also been made clear that these medications are for use by the patient only, without concomitant use of alcohol or other substances unless prescribed. Keep out of reach of  children.  Regino report has been reviewed. If this is going to be prescribed long term, [facility] Controlled Substance Agreement Contract has also been read and signed by patient and myself.    During this visit the following were done:  Labs Reviewed []    Labs Ordered []    Radiology Reports Reviewed []    Radiology Ordered []    PCP Records Reviewed []    Referring Provider Records Reviewed []    ER Records Reviewed []    Hospital Records Reviewed []    History Obtained From Family []    Radiology Images Reviewed []    Other Reviewed [x]    Records Requested []      Dianna Santamaria, APRN  5/24/2021

## 2021-05-24 NOTE — PATIENT INSTRUCTIONS
Carbidopa-levodopa 25-100mg take 1 tablet by mouth twice a day for 2    weeks then 1 tablet by mouth daily x 2 weeks and then stop.    If gait or tremors worsens, call us.

## 2021-05-25 ENCOUNTER — TELEPHONE (OUTPATIENT)
Dept: NEUROLOGY | Facility: CLINIC | Age: 71
End: 2021-05-25

## 2021-05-25 DIAGNOSIS — R25.1 TREMOR: Primary | ICD-10-CM

## 2021-05-25 DIAGNOSIS — R26.9 GAIT ABNORMALITY: ICD-10-CM

## 2021-05-25 NOTE — TELEPHONE ENCOUNTER
Caller: Juanis Carpenter    Relationship: Self    Best call back number: [phone]    Which medication are you concerned about: carbidopa-levodopa (SINEMET)  MG per tablet    Who prescribed you this medication:   STEFANIA HOLDER     What are your concerns:   THE PATIENT STATES THAT SHE WENT TO HER [facility] TO  THIS MEDICATION, AND SHE SAID THE PHARMACY STATED SHE COULDN'T GET IT BECAUSE THE RX WAS WRITTEN WITH A HIGHER DOSAGE. SHE WANTS TO KNOW IF STEFANIA HOLDER CAN RESEND THE RX WITH A LOWER DOSAGE SO SHE CAN GET THIS MEDICATION.     PLEASE ADVISE.

## 2021-06-21 DIAGNOSIS — R25.1 TREMOR: ICD-10-CM

## 2021-06-21 DIAGNOSIS — R27.0 ATAXIA: ICD-10-CM

## 2021-06-21 DIAGNOSIS — R26.9 GAIT ABNORMALITY: ICD-10-CM

## 2021-06-21 RX ORDER — CARBIDOPA AND LEVODOPA 50; 200 MG/1; MG/1
TABLET, EXTENDED RELEASE ORAL
Qty: 180 TABLET | Refills: 1 | OUTPATIENT
Start: 2021-06-21

## 2021-06-21 NOTE — TELEPHONE ENCOUNTER
I sent a message on this earlier.  Per Dianna's last note they were weaning off this medication; can you verify with pt

## 2021-08-18 DIAGNOSIS — I67.89 CEREBRAL MICROVASCULAR DISEASE: ICD-10-CM

## 2021-08-18 RX ORDER — ASPIRIN 81 MG/1
TABLET, COATED ORAL
Qty: 90 TABLET | Refills: 3 | Status: SHIPPED | OUTPATIENT
Start: 2021-08-18 | End: 2022-04-21 | Stop reason: SDUPTHER

## 2021-08-18 NOTE — TELEPHONE ENCOUNTER
Rx Refill Note  Requested Prescriptions     Pending Prescriptions Disp Refills   • Aspirin Low Dose 81 MG EC tablet [Pharmacy Med Name: ASPIRIN EC 81 MG TABLET] 90 tablet 3     Sig: TAKE 1 TABLET BY MOUTH EVERY DAY      Last office visit with prescribing clinician: 5/24/2021      Next office visit with prescribing clinician: 10/18/2021            Alondra Collins CMA  08/18/21, 07:58 EDT

## 2021-09-02 ENCOUNTER — TRANSCRIBE ORDERS (OUTPATIENT)
Dept: ADMINISTRATIVE | Facility: HOSPITAL | Age: 71
End: 2021-09-02

## 2021-09-02 DIAGNOSIS — Z12.31 ENCOUNTER FOR SCREENING MAMMOGRAM FOR MALIGNANT NEOPLASM OF BREAST: Primary | ICD-10-CM

## 2021-10-18 ENCOUNTER — HOSPITAL ENCOUNTER (OUTPATIENT)
Dept: MAMMOGRAPHY | Facility: HOSPITAL | Age: 71
Discharge: HOME | End: 2021-10-18

## 2021-10-18 DIAGNOSIS — Z12.31 ENCOUNTER FOR SCREENING MAMMOGRAM FOR MALIGNANT NEOPLASM OF BREAST: ICD-10-CM

## 2021-11-30 ENCOUNTER — HOSPITAL ENCOUNTER (OUTPATIENT)
Dept: MAMMOGRAPHY | Facility: HOSPITAL | Age: 71
Discharge: HOME | End: 2021-11-30
Admitting: OBSTETRICS & GYNECOLOGY

## 2021-11-30 PROCEDURE — 77063 BREAST TOMOSYNTHESIS BI: CPT | Performed by: RADIOLOGY

## 2021-11-30 PROCEDURE — 77067 SCR MAMMO BI INCL CAD: CPT

## 2021-11-30 PROCEDURE — 77063 BREAST TOMOSYNTHESIS BI: CPT

## 2021-11-30 PROCEDURE — 77067 SCR MAMMO BI INCL CAD: CPT | Performed by: RADIOLOGY

## 2021-12-14 ENCOUNTER — OFFICE VISIT (OUTPATIENT)
Dept: OBSTETRICS AND GYNECOLOGY | Facility: CLINIC | Age: 71
End: 2021-12-14

## 2021-12-14 VITALS
SYSTOLIC BLOOD PRESSURE: 138 MMHG | DIASTOLIC BLOOD PRESSURE: 80 MMHG | BODY MASS INDEX: 38.58 KG/M2 | HEIGHT: 58 IN | WEIGHT: 183.8 LBS

## 2021-12-14 DIAGNOSIS — B37.2 YEAST DERMATITIS: ICD-10-CM

## 2021-12-14 DIAGNOSIS — Z78.0 MENOPAUSE: Primary | ICD-10-CM

## 2021-12-14 PROCEDURE — 99397 PER PM REEVAL EST PAT 65+ YR: CPT | Performed by: OBSTETRICS & GYNECOLOGY

## 2021-12-14 RX ORDER — FLUTICASONE PROPIONATE 50 MCG
SPRAY, SUSPENSION (ML) NASAL
COMMUNITY
Start: 2021-11-22 | End: 2022-08-22

## 2021-12-14 RX ORDER — TOPIRAMATE 25 MG/1
TABLET ORAL AS NEEDED
COMMUNITY
Start: 2021-12-03 | End: 2023-02-24

## 2021-12-14 RX ORDER — FLUCONAZOLE 150 MG/1
TABLET ORAL
Qty: 4 TABLET | Refills: 2 | Status: SHIPPED | OUTPATIENT
Start: 2021-12-14 | End: 2022-08-22

## 2021-12-14 RX ORDER — PHENTERMINE HYDROCHLORIDE 37.5 MG/1
TABLET ORAL AS NEEDED
COMMUNITY
Start: 2021-12-03 | End: 2023-02-24

## 2021-12-14 NOTE — PROGRESS NOTES
GYN Annual Exam     CC - Here for annual exam.        HPI  Juanis Carpenter is a 70 y.o. female, , who presents for annual well woman exam.  She is postmenopausal. Patient denies vaginal bleeding. Patient reports problems with: chronic urinary incontinence (wears pantiliner daily) and constant vulvar eczema. There were no changes to her medical or surgical history since her last visit. Partner Status: Marital Status: .  New Partners since last visit: no    Requesting annual blood work today (non-fasting).    Additional OB/GYN History   Current contraception: contraceptive methods: Post menopausal status  On HRT? No  Last Pap :   Last Completed Pap Smear          PAP SMEAR (Every 2 Years) Next due on 2022  Pap IG, Rfx HPV ASCU    2018  Done - in [place] (negative)              History of abnormal Pap smear: no  Family history of uterine, colon, breast, or ovarian cancer: no  Performs monthly Self-Breast Exam: no  Last mammogram: 2021. Done at [facility].    Last Completed Mammogram          MAMMOGRAM (Yearly) Next due on 2022  Mammo Screening Digital Tomosynthesis Bilateral With CAD    10/12/2020  Mammo Screening Technical Recall Bilateral    2020  Mammo Screening Digital Tomosynthesis Bilateral With CAD    2019  Mammo Screening Digital Tomosynthesis Bilateral With CAD    2018  Mammo Diagnostic Digital Tomosynthesis Bilateral With CAD    Only the first 5 history entries have been loaded, but more history exists.              Last colonoscopy:   Last Completed Colonoscopy          COLORECTAL CANCER SCREENING (COLONOSCOPY - Every 10 Years) Next due on 12/3/2022    2012  COLONOSCOPY (Done - normal, per patient)              Last DEXA: On  and results were Osteopenia  Exercises Regularly: no  Feelings of Anxiety or Depression: no    Tobacco Usage?: No   OB History        4    Para   2    Term   2       0    AB   2  "   Living   2       SAB   2    IAB   0    Ectopic   0    Molar   0    Multiple   0    Live Births   2                Health Maintenance   Topic Date Due   • TDAP/TD VACCINES (1 - Tdap) Never done   • ZOSTER VACCINE (1 of 2) Never done   • Pneumococcal Vaccine 65+ (1 of 1 - PPSV23) Never done   • HEPATITIS C SCREENING  Never done   • ANNUAL WELLNESS VISIT  Never done   • DXA SCAN  11/26/2020   • MAMMOGRAM  11/30/2022   • COLORECTAL CANCER SCREENING  12/03/2022   • PAP SMEAR  12/11/2022   • COVID-19 Vaccine  Completed   • INFLUENZA VACCINE  Completed       The additional following portions of the patient's history were reviewed and updated as appropriate: allergies, current medications, past family history, past medical history, past social history, past surgical history and problem list.    Review of Systems   Constitutional: Negative.    HENT: Negative.    Eyes: Negative.    Respiratory: Negative.    Cardiovascular: Negative.    Gastrointestinal: Negative.    Endocrine: Negative.    Genitourinary: Positive for urinary incontinence.   Musculoskeletal: Positive for gait problem.   Skin:        Eczema   Allergic/Immunologic: Negative.    Hematological: Negative.    Psychiatric/Behavioral: Negative.        I have reviewed and agree with the HPI, ROS, and historical information as entered above. Cm Falk MD    Objective   /80 (BP Location: Right arm, Patient Position: Sitting, Cuff Size: Large Adult)   Ht 147.3 cm (58\")   Wt 83.4 kg (183 lb 12.8 oz)   LMP  (LMP Unknown)   BMI 38.41 kg/m²     Physical Exam  Vitals and nursing note reviewed. Exam conducted with a chaperone present.   Constitutional:       Appearance: She is well-developed.   HENT:      Head: Normocephalic and atraumatic.   Neck:      Thyroid: No thyroid mass or thyromegaly.   Cardiovascular:      Rate and Rhythm: Normal rate and regular rhythm.      Heart sounds: No murmur heard.      Pulmonary:      Effort: Pulmonary effort is normal. No " retractions.      Breath sounds: Normal breath sounds. No wheezing, rhonchi or rales.   Chest:      Chest wall: No mass or tenderness.   Breasts:      Right: Normal. No mass, nipple discharge, skin change or tenderness.      Left: Normal. No mass, nipple discharge, skin change or tenderness.       Abdominal:      General: Bowel sounds are normal.      Palpations: Abdomen is soft. Abdomen is not rigid. There is no mass.      Tenderness: There is no abdominal tenderness. There is no guarding.      Hernia: No hernia is present. There is no hernia in the left inguinal area.   Genitourinary:     Labia:         Right: No rash, tenderness or lesion.         Left: No rash, tenderness or lesion.       Vagina: Normal. No vaginal discharge or lesions.      Cervix: No cervical motion tenderness, discharge, lesion or cervical bleeding.      Uterus: Normal. Not enlarged, not fixed and not tender.       Adnexa:         Right: No mass or tenderness.          Left: No mass or tenderness.        Rectum: No external hemorrhoid.   Musculoskeletal:      Cervical back: Normal range of motion. No muscular tenderness.   Skin:            Comments: Yeast on skin    Neurological:      Mental Status: She is alert and oriented to person, place, and time.   Psychiatric:         Behavior: Behavior normal.            Assessment and Plan    Problem List Items Addressed This Visit     None      Visit Diagnoses     Menopause    -  Primary    Relevant Orders    DEXA Bone Density Appendicular    Yeast dermatitis        Relevant Medications    fluconazole (Diflucan) 150 MG tablet          1. GYN annual well woman exam.   2. Reviewed monthly self breast exams.  Instructed to call with lumps, pain, or breast discharge.  Yearly mammograms ordered.  3. Ordered mammogram today.  4. Osteoporosis screening ordered today.  5. Reviewed exercise as a preventative health measures.   6. Reviewed BMI and weight loss as preventative health measures.   7. RTC in 1  year or PRN with problems.  8. Other: BDS today and no pap   9. No follow-ups on file.     Cm Falk MD  12/14/2021

## 2022-04-21 ENCOUNTER — OFFICE VISIT (OUTPATIENT)
Dept: NEUROLOGY | Facility: CLINIC | Age: 72
End: 2022-04-21

## 2022-04-21 VITALS
OXYGEN SATURATION: 95 % | DIASTOLIC BLOOD PRESSURE: 80 MMHG | SYSTOLIC BLOOD PRESSURE: 130 MMHG | WEIGHT: 177 LBS | HEIGHT: 59 IN | BODY MASS INDEX: 35.68 KG/M2 | HEART RATE: 85 BPM

## 2022-04-21 DIAGNOSIS — M51.34 DDD (DEGENERATIVE DISC DISEASE), THORACIC: ICD-10-CM

## 2022-04-21 DIAGNOSIS — I67.89 CEREBRAL MICROVASCULAR DISEASE: ICD-10-CM

## 2022-04-21 DIAGNOSIS — G89.29 CHRONIC PAIN OF MULTIPLE JOINTS: ICD-10-CM

## 2022-04-21 DIAGNOSIS — M25.50 CHRONIC PAIN OF MULTIPLE JOINTS: ICD-10-CM

## 2022-04-21 DIAGNOSIS — R26.9 GAIT ABNORMALITY: ICD-10-CM

## 2022-04-21 DIAGNOSIS — M51.36 DDD (DEGENERATIVE DISC DISEASE), LUMBAR: ICD-10-CM

## 2022-04-21 DIAGNOSIS — M79.2 NEUROPATHIC PAIN: Primary | ICD-10-CM

## 2022-04-21 DIAGNOSIS — M50.30 DDD (DEGENERATIVE DISC DISEASE), CERVICAL: ICD-10-CM

## 2022-04-21 DIAGNOSIS — R25.1 TREMOR: ICD-10-CM

## 2022-04-21 PROCEDURE — 99214 OFFICE O/P EST MOD 30 MIN: CPT | Performed by: NURSE PRACTITIONER

## 2022-04-21 RX ORDER — ASPIRIN 81 MG/1
81 TABLET ORAL DAILY
Qty: 90 TABLET | Refills: 3 | Status: SHIPPED | OUTPATIENT
Start: 2022-04-21

## 2022-04-21 RX ORDER — GABAPENTIN 300 MG/1
CAPSULE ORAL
Qty: 60 CAPSULE | Refills: 3 | Status: SHIPPED | OUTPATIENT
Start: 2022-04-21 | End: 2022-08-22

## 2022-04-21 NOTE — PATIENT INSTRUCTIONS
Gagan Tolentino MD  Neurology  [address]  [place] [address]     Phone: [phone]      June 15th, 2022 at 10am

## 2022-08-22 ENCOUNTER — OFFICE VISIT (OUTPATIENT)
Dept: NEUROLOGY | Facility: CLINIC | Age: 72
End: 2022-08-22

## 2022-08-22 VITALS
HEART RATE: 89 BPM | DIASTOLIC BLOOD PRESSURE: 60 MMHG | HEIGHT: 59 IN | OXYGEN SATURATION: 96 % | SYSTOLIC BLOOD PRESSURE: 126 MMHG | WEIGHT: 180 LBS | BODY MASS INDEX: 36.29 KG/M2

## 2022-08-22 DIAGNOSIS — I67.89 CEREBRAL MICROVASCULAR DISEASE: ICD-10-CM

## 2022-08-22 DIAGNOSIS — M25.50 CHRONIC PAIN OF MULTIPLE JOINTS: ICD-10-CM

## 2022-08-22 DIAGNOSIS — G89.29 CHRONIC PAIN OF MULTIPLE JOINTS: ICD-10-CM

## 2022-08-22 DIAGNOSIS — R25.1 TREMOR: ICD-10-CM

## 2022-08-22 DIAGNOSIS — R27.0 ATAXIA: Primary | ICD-10-CM

## 2022-08-22 PROCEDURE — 99215 OFFICE O/P EST HI 40 MIN: CPT | Performed by: NURSE PRACTITIONER

## 2022-08-22 RX ORDER — LOSARTAN POTASSIUM AND HYDROCHLOROTHIAZIDE 12.5; 1 MG/1; MG/1
1 TABLET ORAL DAILY
COMMUNITY
Start: 2022-08-10

## 2022-08-22 RX ORDER — CETIRIZINE HYDROCHLORIDE 10 MG/1
10 TABLET ORAL DAILY
COMMUNITY
Start: 2022-08-10

## 2022-08-22 RX ORDER — PLECANATIDE 3 MG/1
3 TABLET ORAL DAILY
COMMUNITY
Start: 2022-08-08 | End: 2022-12-15

## 2022-08-22 RX ORDER — VITAMIN B COMPLEX
1 CAPSULE ORAL DAILY
COMMUNITY
End: 2022-12-15

## 2022-08-22 RX ORDER — AMOXICILLIN 250 MG
3 CAPSULE ORAL DAILY
COMMUNITY
End: 2023-02-24

## 2022-08-22 NOTE — PROGRESS NOTES
Subjective:     Patient ID: Juanis Carpenter is a 71 y.o. female.    CC:   Chief Complaint   Patient presents with   • Tremors   • Peripheral Neuropathy   • Gait Problem       HPI:   History of Present Illness   Today 8/22/2022-  This is a very pleasant 71-year-old female who presents for a 6-month neurology follow-up on neuropathic pains along with known cervical, thoracic, and lumbar degenerative disc changes, chronic pain in multiple joints, cerebral microvascular disease, and having tremor and gait abnormalities. We had referred her to the [facility] to see Dr. Gagan Tolentino,  movement specialist on 06/15/2022 for further evaluation of her symptoms. We wanted him to evaluate for any possibility of movement disorder. He evaluated her and did not see any signs of parkinsonism. He felt that she did have an intermittent mild right resting tremor, mild tremor with action in the right greater than the left. He did not note any dystonia, bradykinesia, or myoclonus. He noted a slow and ataxic gait with no freezing. He did note osteoarthritis. He did note that he felt she likely had some midline cerebellar atrophy and requested a copy of her MRI of the brain disc to review the imaging. He did also order labs including hemoglobin A1c, sed rate, KIERA, vitamin E, vitamin B12, folic acid, fasting lipid panel, CK, urine heavy metals, and urine drug screen to be completed. Lipid panel showed total cholesterol 192, HDL 58, triglycerides 152, and .6. Her CK level was normal at 79. Her folic acid level is greater than 20. Her vitamin B12 level was normal at 516. Her vitamin E level was also normal. Her KIERA was negative. Her sed rate was 3. Hemoglobin A1c was 5.2%. He diagnosed her with some ataxia and lack of coordination. He is scheduled to reevaluate her on 11/30/2022. She has yet to received results of her urine heavy metals testing.    At her last visit in clinic on 04/21/2022, we had prescribed her  "gabapentin 300 mg. She was taking 1 capsule at bedtime and then was going to slowly increase to 1 capsule twice a day. She is not taking the gabapentin. She feels she is on too much medicine.    She reports that she did not receive the results of her 24-hour urinalysis. She states that her primary care provider advised her that her albumin level was elevated. She notes that her blood pressure was 174 over 86 at her last visit with her primary care provider. She states that her systolic blood pressure has been ranging in the 140s over 60s and her primary care provider switched her from losartan potassium to losartan with the hydrochlorothiazide. She reports that she has noticed a difference in her blood pressure since starting the losartan with HCTZ where it is lower.      She reports she is still experiencing tremors in her hands, and is unsure of a pattern, or what could be causing it. She states the tremor is tolerable when she is not picking up objects with her hands. She states the tremor is intermittently worse when she is reading a newspaper. Right hand tremor. She reports that she is no longer driving.      She reports that her gait has always been slow, and it takes her 1 to 2 minutes to get going when she gets up from a seated position. She states if she ambulates for a long period of time or does activities around the house, she will experience numbness in her lower back. She notes that when she sits down and rests for 5 to 10 minutes the numbness improves. She reports she also experiences stiffness and pain when she wakes up in the morning, but once she starts standing and moving, the numbness and pain improves. She reports she has no balance, and is unable to ambulate at any distance without assistance such as a cane or walker. She notes that she is able to \"fly\" with a shopping cart at the store. She denies any falls since her last visit. Anytime she walks on her own she sways to one side of the other " "and has to hold on to her 's arm or a wall.    The patient reports that she is not currently utilizing Topamax or phentermine for weight loss, and has stopped utilizing gabapentin for her back. She states that she was at a plateau with her weight loss medications and was advised if she takes the medication long enough, it will quit working. She notes that she has been off of the Topamax since 07/2022 and phentermine since 08/2022.     She reports that she had a colonoscopy performed, and was informed she has diverticulitis. She states she was advised to begin using Linzess, however she was not able to due to insurance. She reports she had called her GI back and was advised to use Senokot 2 times daily. She states she was advised if she did not begin medication her diverticulitis could worsen, and has been utilizing Senokot 3 times daily in order for her to have any bowel movement, as well as Trulance daily.      She reports she was advised by her retina specialist, Dr. 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 She reports she receives an injection in her right eye every 6-weeks.     Additionally, she states her height is 4'10\" and she is unable to exercise as much as she would like. She states she does chores around the house such as sweeping and dusting.     Prior Extensive Neurological workup and history:  Chronic difficulty with balance and gait since around 2018. She has also had tremors present since around the same time.   The patient states she saw no improvement with the carbidopa-levodopa (Sinemet), and she discontinued it.   Tremors  affecting the right hand more so than the left. The patient states the tremors do not interrupt her activities of daily living and are not debilitating. She notices the tremors when she is holding an object like a piece of paper, newspaper, or a thin book, and sometimes when she is holding a fork.   She does not notice the " tremor when writing.   Her primary care did stop the Cymbalta due to concern that it was causing some of her tremor. The tremor has not been changed since stopping the Cymbalta. She does not notice any tremor when ambulating.     Chronic mobility concerns. She uses a rollator walker to help her go faster. The patient feels her balance is very poor and unchanged, and she has to be very careful. She ambulates slowly when using a cane or when supported by her .      The patient denies anosmia. She does note constipation. The patient is seeing a bariatric provider. She is taking phentermine and Topamax once a day for weight loss.      She has a history of arthritis throughout the spine. Previous emg/ncvs was normal in 2018.     She had MRI of the C-spine without contrast completed on 6/23/2020 which does show multilevel degenerative disc changes from C4-C5, C5-C6 and C6-C7 with moderate canal stenosis from C4-C5 and mild canal stenosis from C5-C6 and C6-C7.  No significant cervical myelopathy.  No trauma.  No spinal cord lesions.  She has started going to [facility] for rehab and she has significantly improved with her gait and balance.  I was able to review their notes today and she really had just not completely recovered from her knee replacement surgery. Prior extensive workup: MRI of the brain without contrast and this was completed at [facility] on 9/19/2019 and per radiology report this is an unremarkable MRI of the head and imaging was also reviewed previously in office and does appear to have some age-appropriate chronic small vessel ischemic changes but no acute intracranial abnormalities. She has seen multiple providers [facility], her primary care provider, another orthopedic specialist as well as an orthopedic spine specialist.  She did undergo a nerve and muscle study of the lower extremities and this was completed on 8/9/2018 and showed a normal study of  the bilateral lower extremities with no evidence of neuropathy or muscle damage.  She has had additional x-rays of her knee which showed no acute findings.  She also underwent an MRI of the lumbar spine on 2018 and that showed multilevel degenerative disc changes most prominent at L4-L5 with some stenosis. She also underwent an MRI of the thoracic spine even more recently on 6/3/2019 and this showed some mild degenerative disc changes with moderate osteophytes from T11-T12 and some moderate foraminal narrowing.       [facility] evaluation scheduled. She had a cardiac risk assessment screening with negative carotid duplex, normal ROMELIA BLE, normal Osteoporosis screening, Moderate CV risk score of 11. She has tried to diet and lose weight but has been unsuccessful. She has trouble exercising due to her pain and gait issues.     The following portions of the patient's history were reviewed and updated as appropriate: allergies, current medications, past family history, past medical history, past social history, past surgical history and problem list.    Past Medical History:   Diagnosis Date   • Arthritis    • Difficulty walking    • Eczema    • History of anxiety    • History of colonic diverticulitis    • History of emphysema    • Hypertension    • Imbalance     since knee surgery   • Irritable bowel syndrome (IBS)     irritable bowel constipation   • Osteopenia    • Sleep apnea with use of continuous positive airway pressure (CPAP)    • Urinary incontinence    • Use of cane as ambulatory aid        Past Surgical History:   Procedure Laterality Date   •  SECTION      x2   • CHEST TUBE INSERTION  2012    for emphysema   • CHOLECYSTECTOMY     • DILATATION AND CURETTAGE     • LAPAROSCOPIC TUBAL LIGATION     • OOPHORECTOMY      one side removed   • REPLACEMENT TOTAL KNEE Right 2018       Social History     Socioeconomic History   • Marital status:     Tobacco Use   • Smoking status: Never Smoker   • Smokeless tobacco: Never Used   Vaping Use   • Vaping Use: Never used   Substance and Sexual Activity   • Alcohol use: Never   • Drug use: Never   • Sexual activity: Not Currently     Partners: Male     Birth control/protection: Post-menopausal       Family History   Problem Relation Age of Onset   • Hypertension Mother    • Brain cancer Mother    • Dementia Father    • Stroke Father    • Hypertension Father    • Skin cancer Father    • Migraines Maternal Aunt    • Breast cancer Neg Hx    • Ovarian cancer Neg Hx    • Colon cancer Neg Hx           Current Outpatient Medications:   •  aspirin (Aspirin Low Dose) 81 MG EC tablet, Take 1 tablet by mouth Daily., Disp: 90 tablet, Rfl: 3  •  B Complex Vitamins (vitamin b complex) capsule capsule, Take 1 capsule by mouth Daily., Disp: , Rfl:   •  cetirizine (zyrTEC) 10 MG tablet, Take 10 mg by mouth Daily., Disp: , Rfl:   •  esomeprazole (nexIUM) 20 MG capsule, Take 20 mg by mouth Every Morning Before Breakfast., Disp: , Rfl:   •  FLUTICASONE PROPIONATE, NASAL, NA, into the nostril(s) as directed by provider., Disp: , Rfl:   •  losartan-hydrochlorothiazide (HYZAAR) 100-12.5 MG per tablet, Take 1 tablet by mouth Daily., Disp: , Rfl:   •  montelukast (SINGULAIR) 10 MG tablet, montelukast 10 mg tablet  Take 1 tablet every day by oral route., Disp: , Rfl:   •  Omega-3 Fatty Acids (FISH OIL) 1000 MG capsule capsule, Take  by mouth Daily With Breakfast., Disp: , Rfl:   •  phentermine (ADIPEX-P) 37.5 MG tablet, As Needed., Disp: , Rfl:   •  sennosides-docusate (senna-docusate sodium) 8.6-50 MG per tablet, Take 3 tablets by mouth Daily., Disp: , Rfl:   •  TobraDex 0.3-0.1 % ophthalmic ointment, , Disp: , Rfl:   •  topiramate (TOPAMAX) 25 MG tablet, As Needed., Disp: , Rfl:   •  Trulance 3 MG tablet, Take 3 mg by mouth Daily., Disp: , Rfl:   •  verapamil SR (CALAN-SR) 180 MG CR tablet, verapamil ER (SR) 180 mg tablet,extended  "release, Disp: , Rfl:   •  vitamin C (ASCORBIC ACID) 500 MG tablet, Take 500 mg by mouth Daily., Disp: , Rfl:      Review of Systems   Constitutional: Negative for chills, fatigue, fever and unexpected weight change.   HENT: Negative for ear pain, hearing loss, nosebleeds, rhinorrhea and sore throat.    Eyes: Negative for photophobia, pain, discharge, itching and visual disturbance.   Respiratory: Negative for cough, chest tightness, shortness of breath and wheezing.    Cardiovascular: Negative for chest pain, palpitations and leg swelling.   Gastrointestinal: Negative for abdominal pain, blood in stool, constipation, diarrhea, nausea and vomiting.   Genitourinary: Negative for dysuria, frequency, hematuria and urgency.   Musculoskeletal: Positive for back pain, gait problem and neck pain. Negative for arthralgias, joint swelling, myalgias and neck stiffness.   Skin: Negative for rash and wound.   Allergic/Immunologic: Negative for environmental allergies and food allergies.   Neurological: Positive for tremors. Negative for dizziness, seizures, syncope, speech difficulty, weakness, light-headedness, numbness and headaches.   Hematological: Negative for adenopathy. Does not bruise/bleed easily.   Psychiatric/Behavioral: Negative for agitation, confusion, decreased concentration, hallucinations, sleep disturbance and suicidal ideas. The patient is nervous/anxious.    All other systems reviewed and are negative.       Objective:  /60   Pulse 89   Ht 149.9 cm (59\")   Wt 81.6 kg (180 lb)   LMP  (LMP Unknown)   SpO2 96%   BMI 36.36 kg/m²     Neurologic Exam     Mental Status   Oriented to person, place, and time.   Speech: speech is normal   Level of consciousness: alert    Cranial Nerves   Cranial nerves II through XII intact.     CN III, IV, VI   Nystagmus: none (did not see any on exam today)   Diplopia: none  Ophthalmoparesis: none  Upgaze: normal  Downgaze: normal    Motor Exam   Muscle bulk: " normal  Overall muscle tone: normal    Strength   Strength 5/5 throughout.   Chronic joint pain multiple areas and chronic Cervical/Thoracic/Lumbar DDD     Gait, Coordination, and Reflexes     Gait  Gait: wide-based (slow, ataxia without walker, touches wall as she ambulates, some antalgia, while using rolling walker, she is able to ambulate quickly without swaying and minimal antalgia)    Coordination   Finger to nose coordination: normal    Tremor   Resting tremor: present (mild right hand resting tremor noted)  Intention tremor: present (minimal fine BUE kinetic hand tremor R>L)  Action tremor: absent    Reflexes   Reflexes 2+ except as noted.   Right : 2+  Left : 2+      Physical Exam  Constitutional:       Appearance: Normal appearance. She is obese.      Comments: BMI 36.3   Neurological:      Mental Status: She is alert and oriented to person, place, and time.      Coordination: Finger-Nose-Finger Test normal.      Deep Tendon Reflexes: Strength normal.   Psychiatric:         Mood and Affect: Mood is anxious.         Speech: Speech normal.         Behavior: Behavior normal.         Thought Content: Thought content normal.         Cognition and Memory: Cognition and memory normal.         Judgment: Judgment normal.         Assessment/Plan:       Diagnoses and all orders for this visit:    1. Ataxia (Primary)  Comments:  continue walker and follow up with [facility] 11/30/2022 at scheduled    2. Tremor  Comments:  continue walker and follow up with [facility] 11/30/2022 at scheduled    3. Cerebral microvascular disease  Comments:  continue aspirin 81mg daily, monitor hypertension with PCP, lipids WNL    4. Chronic pain of multiple joints  Comments:  Lumbar/Cervical/Thoracic DDD-wishes to avoud medications, has not required gabapentin.    She does continue to have right hand resting tremor. She also has mild fine kinetic hand tremor in the right greater than left extremity. Today, she is  able to ambulate in the hallway without assistance, but does have to hold onto the wall due to ataxia and antalgia, very slow gait with turns. When she is using her rolling walker, she is able to ambulate much quicker.     She does have a copy of her MRI of the brain disc and will be taking this to her follow-up with the  movement specialist on 11/30/2022 for him to review for possible cerebellar atrophy.    I was able to review her labs from  except for the heavy metal urine. She will inquire about those results at her follow-up appointment.    She will follow-up in 6-months or sooner if needed.    Total time of visit today was 40 minutes, which included reviewing [facility] notes and labs, reviewing these again with patient today in clinic, obtaining additional history, completing exam, discussing findings and recommendations moving forward. She will call us with any additional questions or concerns prior to follow-up.     Reviewed medications, potential side effects and signs and symptoms to report. Discussed risk versus benefits of treatment plan with patient and/or family-including medications, labs and radiology that may be ordered. Addressed questions and concerns during visit. Patient and/or family verbalized understanding and agree with plan.    AS THE PROVIDER, I PERSONALLY WORE PPE DURING ENTIRE FACE TO FACE ENCOUNTER IN CLINIC WITH THE PATIENT. PATIENT ALSO WORE PPE DURING ENTIRE FACE TO FACE ENCOUNTER EXCEPT FOR A MAX OF 30 SECONDS DURING NEUROLOGICAL EVALUATION OF CRANIAL NERVES AND THEN MASK WAS PLACED BACK OVER PATIENT FACE FOR REMAINDER OF VISIT. I WASHED MY HANDS BEFORE AND AFTER VISIT.    During this visit the following were done:  Labs Reviewed [x]    Labs Ordered []    Radiology Reports Reviewed [x]    Radiology Ordered []    PCP Records Reviewed []    Referring Provider Records Reviewed []    ER Records Reviewed []    Hospital Records Reviewed []    History Obtained From Family []     Radiology Images Reviewed []    Other Reviewed [x]  movement clinic notes   Records Requested []      Transcribed from ambient dictation for JUDITH Nye by Rossy Dixon.  08/22/22   14:35 EDT    Patient verbalized consent to the visit recording.  I have personally performed the services described in this document as transcribed by the above individual, and it is both accurate and complete.  JUDITH Nye  8/23/2022  15:26 EDT

## 2022-10-27 ENCOUNTER — TRANSCRIBE ORDERS (OUTPATIENT)
Dept: ADMINISTRATIVE | Facility: HOSPITAL | Age: 72
End: 2022-10-27

## 2022-10-27 DIAGNOSIS — Z12.31 VISIT FOR SCREENING MAMMOGRAM: Primary | ICD-10-CM

## 2022-11-23 NOTE — TELEPHONE ENCOUNTER
----- Message from JUDITH Nye sent at 11/11/2019  8:04 AM EST -----  Please notify patient x-ray of the cervical spine does show extensive arthritis changes, however there is no fracture present.  When she is able to complete her physical therapy if her neck is not improving we could consider an MRI of her cervical spine at that time.  We will follow-up with her as scheduled.  Thanks, JUDITH Aguila.   Heterosexual

## 2022-12-06 ENCOUNTER — HOSPITAL ENCOUNTER (OUTPATIENT)
Dept: MAMMOGRAPHY | Facility: HOSPITAL | Age: 72
Discharge: HOME | End: 2022-12-06
Admitting: OBSTETRICS & GYNECOLOGY

## 2022-12-06 DIAGNOSIS — Z12.31 VISIT FOR SCREENING MAMMOGRAM: ICD-10-CM

## 2022-12-06 PROCEDURE — 77067 SCR MAMMO BI INCL CAD: CPT | Performed by: RADIOLOGY

## 2022-12-06 PROCEDURE — 77063 BREAST TOMOSYNTHESIS BI: CPT

## 2022-12-06 PROCEDURE — 77063 BREAST TOMOSYNTHESIS BI: CPT | Performed by: RADIOLOGY

## 2022-12-06 PROCEDURE — 77067 SCR MAMMO BI INCL CAD: CPT

## 2022-12-15 ENCOUNTER — OFFICE VISIT (OUTPATIENT)
Dept: OBSTETRICS AND GYNECOLOGY | Facility: CLINIC | Age: 72
End: 2022-12-15

## 2022-12-15 VITALS — SYSTOLIC BLOOD PRESSURE: 128 MMHG | BODY MASS INDEX: 37.24 KG/M2 | WEIGHT: 184.4 LBS | DIASTOLIC BLOOD PRESSURE: 80 MMHG

## 2022-12-15 DIAGNOSIS — N89.8 VAGINAL IRRITATION: ICD-10-CM

## 2022-12-15 DIAGNOSIS — B37.2 CANDIDAL INTERTRIGO: ICD-10-CM

## 2022-12-15 DIAGNOSIS — B37.31 CANDIDIASIS, VULVA: ICD-10-CM

## 2022-12-15 DIAGNOSIS — Z01.419 PAP TEST, AS PART OF ROUTINE GYNECOLOGICAL EXAMINATION: Primary | ICD-10-CM

## 2022-12-15 PROCEDURE — G0101 CA SCREEN;PELVIC/BREAST EXAM: HCPCS | Performed by: NURSE PRACTITIONER

## 2022-12-15 RX ORDER — FLUCONAZOLE 150 MG/1
150 TABLET ORAL DAILY
Qty: 2 TABLET | Refills: 0 | Status: SHIPPED | OUTPATIENT
Start: 2022-12-15 | End: 2023-03-08

## 2022-12-15 RX ORDER — NYSTATIN 100000 [USP'U]/G
POWDER TOPICAL 3 TIMES DAILY
Qty: 1 EACH | Refills: 6 | Status: SHIPPED | OUTPATIENT
Start: 2022-12-15

## 2022-12-15 RX ORDER — NYSTATIN 100000 U/G
1 CREAM TOPICAL 2 TIMES DAILY PRN
Qty: 30 G | Refills: 0 | Status: SHIPPED | OUTPATIENT
Start: 2022-12-15

## 2022-12-15 NOTE — PROGRESS NOTES
Gynecologic Annual Exam Note        GYN Annual Exam     CC - Here for annual exam.     Subjective     HPI  Juanis Carpenter is a 71 y.o. female, , who presents for annual well woman exam as a(n) established patient.  She is postmenopausal.  Patient denies vaginal bleeding.   Patient reports problems with: vaginal rash. She reports that there is significant amount of itching. especially at night. She also reports redness, itching and burning under breast folds.    Pt. reports moderate urinary incontinence. She reports urine leakage continuously and wears depends and poise type pads. She changes these frequently and avoids sitting with moisture for long periods. She reports waking at least once a night to void and has difficulty making it to the bathroom. She also reports significant constipation. She has been seen for this in the past and does not desire intervention at this time.    There were no changes to her medical or surgical history since her last visit.     Partner Status: Marital Status: .  She is not currently sexually active. STD testing recommendations have been explained to the patient and she does not desire STD testing.    Additional OB/GYN History     On HRT? No    Last Pap : 20. Results: negative. HPV: not done    Last Completed Pap Smear     This patient has no relevant Health Maintenance data.        History of abnormal Pap smear: no  Family history of uterine, colon, breast, or ovarian cancer: no  Performs monthly Self-Breast Exam: no  Last mammogram: 22. Done at .    Last Completed Mammogram          MAMMOGRAM (Yearly) Next due on 2022  Mammo Screening Digital Tomosynthesis Bilateral With CAD    2021  Mammo Screening Digital Tomosynthesis Bilateral With CAD    10/12/2020  Mammo Screening Technical Recall Bilateral    2020  Mammo Screening Digital Tomosynthesis Bilateral With CAD    2019  Mammo Screening Digital Tomosynthesis  Bilateral With CAD    Only the first 5 history entries have been loaded, but more history exists.              Last colonoscopy: has had a colonoscopy 3 month(s) ago.    Last Completed Colonoscopy     This patient has no relevant Health Maintenance data.        Last DEXA: On 12/14/21 and results were Osteopenia  Exercises Regularly: no  Feelings of Anxiety or Depression: no      Tobacco Usage?: No       Current Outpatient Medications:   •  aspirin (Aspirin Low Dose) 81 MG EC tablet, Take 1 tablet by mouth Daily., Disp: 90 tablet, Rfl: 3  •  cetirizine (zyrTEC) 10 MG tablet, Take 10 mg by mouth Daily., Disp: , Rfl:   •  esomeprazole (nexIUM) 20 MG capsule, Take 20 mg by mouth Every Morning Before Breakfast., Disp: , Rfl:   •  FLUTICASONE PROPIONATE, NASAL, NA, into the nostril(s) as directed by provider., Disp: , Rfl:   •  losartan-hydrochlorothiazide (HYZAAR) 100-12.5 MG per tablet, Take 1 tablet by mouth Daily., Disp: , Rfl:   •  montelukast (SINGULAIR) 10 MG tablet, montelukast 10 mg tablet  Take 1 tablet every day by oral route., Disp: , Rfl:   •  Omega-3 Fatty Acids (FISH OIL) 1000 MG capsule capsule, Take  by mouth Daily With Breakfast., Disp: , Rfl:   •  phentermine (ADIPEX-P) 37.5 MG tablet, As Needed., Disp: , Rfl:   •  sennosides-docusate (PERICOLACE) 8.6-50 MG per tablet, Take 3 tablets by mouth Daily., Disp: , Rfl:   •  TobraDex 0.3-0.1 % ophthalmic ointment, , Disp: , Rfl:   •  verapamil SR (CALAN-SR) 180 MG CR tablet, verapamil ER (SR) 180 mg tablet,extended release, Disp: , Rfl:   •  vitamin C (ASCORBIC ACID) 500 MG tablet, Take 500 mg by mouth Daily., Disp: , Rfl:   •  fluconazole (Diflucan) 150 MG tablet, Take 1 tablet by mouth Daily. Repeat in 3 days, Disp: 2 tablet, Rfl: 0  •  nystatin (MYCOSTATIN) 411127 UNIT/GM cream, Apply 1 application topically to the appropriate area as directed 2 (Two) Times a Day As Needed (Apply to genital area twice daily as needed)., Disp: 30 g, Rfl: 0  •  nystatin  (MYCOSTATIN) 178359 UNIT/GM powder, Apply  topically to the appropriate area as directed 3 (Three) Times a Day. Use under breast folds, Disp: 1 each, Rfl: 6  •  topiramate (TOPAMAX) 25 MG tablet, As Needed., Disp: , Rfl:   •  Trulance 3 MG tablet, Take 3 mg by mouth Daily., Disp: , Rfl:     Patient denies the need for medication refills today.    OB History        4    Para   2    Term   2       0    AB   2    Living   2       SAB   2    IAB   0    Ectopic   0    Molar   0    Multiple   0    Live Births   2                Past Medical History:   Diagnosis Date   • Arthritis    • Difficulty walking    • Eczema    • History of anxiety    • History of colonic diverticulitis    • History of emphysema    • Hypertension    • Imbalance     since knee surgery   • Irritable bowel syndrome (IBS)     irritable bowel constipation   • Osteopenia    • Sleep apnea with use of continuous positive airway pressure (CPAP)    • Urinary incontinence    • Use of cane as ambulatory aid         Past Surgical History:   Procedure Laterality Date   •  SECTION      x2   • CHEST TUBE INSERTION  2012    for emphysema   • CHOLECYSTECTOMY     • DILATATION AND CURETTAGE     • LAPAROSCOPIC TUBAL LIGATION     • OOPHORECTOMY      one side removed   • REPLACEMENT TOTAL KNEE Right 2018       Health Maintenance   Topic Date Due   • TDAP/TD VACCINES (1 - Tdap) Never done   • ZOSTER VACCINE (1 of 2) Never done   • Pneumococcal Vaccine 65+ (1 - PCV) Never done   • HEPATITIS C SCREENING  Never done   • ANNUAL WELLNESS VISIT  Never done   • DXA SCAN  2020   • COLORECTAL CANCER SCREENING  2022   • PAP SMEAR  2022   • MAMMOGRAM  2023   • COVID-19 Vaccine  Completed   • INFLUENZA VACCINE  Completed       The additional following portions of the patient's history were reviewed and updated as appropriate: allergies, current medications, past family history, past medical history, past social history and past  surgical history.    Review of Systems   Constitutional: Negative.    Respiratory: Negative.    Cardiovascular: Negative.    Gastrointestinal: Negative.    Genitourinary: Positive for vaginal pain ( and itching).   Psychiatric/Behavioral: Negative.          I have reviewed and agree with the HPI, ROS, and historical information as entered above. Ramu Pickard, APRN       Objective   /80   Wt 83.6 kg (184 lb 6.4 oz)   LMP  (LMP Unknown)   BMI 37.24 kg/m²     Physical Exam  Vitals and nursing note reviewed. Exam conducted with a chaperone present.   Constitutional:       Appearance: Normal appearance. She is well-developed. She is obese.   HENT:      Head: Normocephalic and atraumatic.   Neck:      Thyroid: No thyroid mass or thyromegaly.   Cardiovascular:      Rate and Rhythm: Normal rate and regular rhythm.      Heart sounds: No murmur heard.  Pulmonary:      Effort: Pulmonary effort is normal. No retractions.      Breath sounds: Normal breath sounds. No wheezing, rhonchi or rales.   Chest:      Chest wall: No mass or tenderness.   Breasts:     Right: Normal. No mass, nipple discharge, skin change or tenderness.      Left: Normal. No mass, nipple discharge, skin change or tenderness.      Comments: Reddened skin under both breast folds. Pt reports itching and burning of the area  Abdominal:      Palpations: Abdomen is soft. Abdomen is not rigid. There is no mass.      Tenderness: There is no abdominal tenderness. There is no guarding.      Hernia: No hernia is present.   Genitourinary:     General: Normal vulva.      Exam position: Lithotomy position.      Labia:         Right: No rash, tenderness or lesion.         Left: No rash, tenderness or lesion.       Vagina: Normal. No vaginal discharge or lesions.      Cervix: No cervical motion tenderness, discharge, lesion or cervical bleeding.      Uterus: Normal. Not enlarged, not fixed and not tender.       Adnexa: Right adnexa normal and left adnexa  normal.        Right: No mass or tenderness.          Left: No mass or tenderness.        Rectum: Normal. No external hemorrhoid.      Comments: Redness of mons pubis and labia bilaterally. Pt reports itching and burning especially at night. Does not have lichen sclerosis appearance.  Musculoskeletal:      Cervical back: Normal range of motion. No muscular tenderness.   Neurological:      Mental Status: She is alert and oriented to person, place, and time.   Psychiatric:         Behavior: Behavior normal.            Assessment and Plan    Problem List Items Addressed This Visit    None  Visit Diagnoses     Pap test, as part of routine gynecological examination    -  Primary    Relevant Orders    LIQUID-BASED PAP SMEAR, [facility] ([facility])    Vaginal irritation        Relevant Medications    nystatin (MYCOSTATIN) 541776 UNIT/GM cream    fluconazole (Diflucan) 150 MG tablet    Candidiasis, vulva        Relevant Medications    nystatin (MYCOSTATIN) 213821 UNIT/GM cream    fluconazole (Diflucan) 150 MG tablet    Candidal intertrigo        Relevant Medications    nystatin (MYCOSTATIN) 705188 UNIT/GM powder    nystatin (MYCOSTATIN) 037231 UNIT/GM cream    fluconazole (Diflucan) 150 MG tablet          1. GYN annual well woman exam.   2. Pap guidelines reviewed with pt. She does desire pap today.  3. Urinary incontinence: continue to keep the area clean and dry as much as possible. Frequent toileting breaks. May consider uro/gyn referral if patient wants to move forward with further intervention.  4. Diflucan Erx. Take as directed. Nystatin cream Erx. Apply cream to affected vulvar area as directed.  5. Nystatin powder Erx to breast folds as directed.  6. Reviewed monthly self breast exams.  Instructed to call with lumps, pain, or breast discharge.  Yearly mammograms ordered.  7. Reviewed exercise as a preventative health measures.   8. Reviewed BMI and weight loss as preventative health measures.   9. Recommended Flu  Vaccine in Fall of each year.  10. RTC in 1 year or PRN with problems.      Ramu Pickard, APRN  12/15/2022

## 2022-12-19 LAB — REF LAB TEST METHOD: NORMAL

## 2023-02-24 ENCOUNTER — OFFICE VISIT (OUTPATIENT)
Dept: NEUROLOGY | Facility: CLINIC | Age: 73
End: 2023-02-24
Payer: MEDICARE

## 2023-02-24 VITALS
OXYGEN SATURATION: 90 % | DIASTOLIC BLOOD PRESSURE: 80 MMHG | BODY MASS INDEX: 37.9 KG/M2 | WEIGHT: 188 LBS | SYSTOLIC BLOOD PRESSURE: 120 MMHG | HEART RATE: 70 BPM | HEIGHT: 59 IN

## 2023-02-24 DIAGNOSIS — M19.90 ARTHRITIS: ICD-10-CM

## 2023-02-24 DIAGNOSIS — G89.29 CHRONIC PAIN OF MULTIPLE JOINTS: ICD-10-CM

## 2023-02-24 DIAGNOSIS — R27.0 ATAXIA: Primary | ICD-10-CM

## 2023-02-24 DIAGNOSIS — M25.50 CHRONIC PAIN OF MULTIPLE JOINTS: ICD-10-CM

## 2023-02-24 DIAGNOSIS — R25.1 TREMOR: ICD-10-CM

## 2023-02-24 DIAGNOSIS — I67.89 CEREBRAL MICROVASCULAR DISEASE: ICD-10-CM

## 2023-02-24 PROCEDURE — 99214 OFFICE O/P EST MOD 30 MIN: CPT | Performed by: NURSE PRACTITIONER

## 2023-02-24 NOTE — PROGRESS NOTES
"Subjective:     Patient ID: Juanis Carpenter is a 72 y.o. female.    CC:   Chief Complaint   Patient presents with   • Difficulty Walking   • Tremors       HPI:   History of Present Illness   Today 2/24/2023-  This is a pleasant 72-year-old female who presents for 6-month neurology follow-up on 4-5 years of ataxia and tremor. She also has known cerebral microvascular disease and has chronic pain in multiple joints. She has been evaluated by [facility]. She last saw Dr. Gagan Tolentino on 11/30/2022 for ataxic gait. At that visit, he noted mild Parkinson's disease, questionable, and would examine her in 6 months for evolution of symptoms. He included other differential diagnoses of musculoskeletal pathology. He started her on a trial of Sinemet and recommended follow-up, which she will see him again on 03/06/2023.     Today, she reports she is doing well. She states Dr. Tolentino gave her Sinemet, but it made her extremely nauseous. When she previously took Sinemet for over 2 weeks, she did not experience nausea, but she did not find it helpful. She restarted Sinemet and was told to take 1 tablet in the morning the first week and then 1 tablet twice a day the second week. She states she took Sinemet for a total of 6 days. On the 5th day, she started feeling \"really bad.\" The following 2 days, she experienced profuse perspiration. She notes she did not take the Sinemet on an empty stomach. She states she would start to feel sick within a couple of hours of taking the Sinemet. She reports she also experienced a headache and had to put a cold cloth on her head. She has not yet told Dr. Tolentino about this as this occurred since she last saw him. She confirms Dr. Tolentino looked at her MRI and she was told he \"did not see any signs of anything.\"    She states she is still having the tremor in her right hand, but she is now starting to experience it in her right forearm. She is not experiencing the tremor anywhere else. " "She states she notices the tremor the most when she is holding something, such as her coffee cup or the newspaper. She has a family history of tremor in a paternal aunt, but she did not have Parkinson's disease. She reports her primary care provider, Dr. Lauri Laboy, believed she may have essential tremor and that her tremor was hereditary. She previously took Cymbalta, and when she was taken off of this, her tremor improved some.    She confirms she is still having issues with her gait. She has not fallen. She reports Dr. Tolentino recommended she see an orthopedic specialist for her bilateral hips and low back. She states her spine issues began after her right total knee arthroplasty in 2018 or 2019. Her orthopedic surgeon was Dr. Gaurang Gonzalez. She states she was previously told that \"everything was okay.\" She has not received injections. She states \"it is not that painful,\" but she has no balance and cannot walk. She states it does hurt, and when she stands up after sitting for an extended period of time, it takes her a few minutes to \"get going.\" She reports her bilateral knees and hips hurt. She notes her left knee may need to be replaced, but she does not believe she can do this. She reports she has to have something to help her ambulate, such as a cane or a buggy. She notes if she has something to push, such as a buggy, she will move quickly. She states she tries not to use a cane at home. She reports she will walk at home, and all of a sudden will have to do something to get her balance back. She states she does not take anything for her gait issues. She notes it is very difficult to get up in the morning because of her bilateral knees. She states she has been walking in her [place]. She reports she has been using a walker and is able to walk for 40 to 45 minutes at a time, but is often sore when she is finished.     She notes she is aware that water is best for arthritis. She states she does not have a " "[facility] facility nearby. She previously went to the [facility], but reports she had a \"horrendous experience.\" She states she worked with someone new each day. She reports she was told to see a psychiatrist because \"it might be psychological,\" and when she made an appointment and saw the psychiatrist, she was told there is nothing wrong with her. When she went back to the [facility], she was told to \"throw her cane away\" because she can walk. She then told them she cannot walk, and was then told if she fell, it \"would not hurt her.\" She states she went to another facility that was helpful, but they completed as many sessions as they could, and she was still unable to ambulate without a cane or walker.     She reports she was previously taking Topamax for weight loss, but she is no longer taking this. She was also taking phentermine for weight loss, but she is no longer taking this as it was causing her blood pressure to increase. 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 She notes she was told she needed to ensure her blood pressure was well controlled. She states the last time she was given something, it felt as though her eyes were getting clouded or \"foggy.\"    Prior Extensive Neurological workup and history:  Chronic difficulty with balance and gait since around 2018. She has also had tremors present since around the same time.   She saw no improvement with the carbidopa-levodopa (Sinemet), and she discontinued it.   Tremors  affecting the right hand more so than the left. The patient states the tremors do not interrupt her activities of daily living and are not debilitating. She notices the tremors when she is holding an object like a piece of paper, newspaper, or a thin book, and sometimes when she is holding a fork.   She does not notice the tremor when writing.   Her primary care did stop the Cymbalta due " to concern that it was causing some of her tremor. The tremor has not been changed since stopping the Cymbalta. She does not notice any tremor when ambulating.     Chronic mobility concerns. She uses a rollator walker to help her go faster. The patient feels her balance is very poor and unchanged, and she has to be very careful. She ambulates slowly when using a cane or when supported by her .      She denies anosmia. She does note constipation.      She has a history of arthritis throughout the spine. Previous emg/ncvs was normal in 2018.     She had MRI of the C-spine without contrast completed on 6/23/2020 which does show multilevel degenerative disc changes from C4-C5, C5-C6 and C6-C7 with moderate canal stenosis from C4-C5 and mild canal stenosis from C5-C6 and C6-C7.  No significant cervical myelopathy.  No trauma.  No spinal cord lesions.  Completed therapy with orthopedics for rehab in past. Prior extensive workup: MRI of the brain without contrast and this was completed at [facility] on 9/19/2019 and per radiology report this is an unremarkable MRI of the head and imaging was also reviewed previously in office and does appear to have some age-appropriate chronic small vessel ischemic changes but no acute intracranial abnormalities. She has seen multiple providers Dr. Gaurang Gonzalez orthopedics, her primary care provider, another orthopedic specialist as well as an orthopedic spine specialist.  She did undergo a nerve and muscle study of the lower extremities and this was completed on 8/9/2018 and showed a normal study of the bilateral lower extremities with no evidence of neuropathy or muscle damage.  She has had additional x-rays of her knee which showed no acute findings.  She also underwent an MRI of the lumbar spine on 12/28/2018 and that showed multilevel degenerative disc changes most prominent at L4-L5 with some stenosis. She also underwent an MRI of the thoracic spine  6/3/2019  "and this showed some mild degenerative disc changes with moderate osteophytes from T11-T12 and some moderate foraminal narrowing.      She reports she was advised by her retina specialist, Dr. Willingham of [facility], that she had a \"stroke behind her right eye\". She reports she receives an injection in her right eye every 6-weeks.     Neuropathic pains along with known cervical, thoracic, and lumbar degenerative disc changes, chronic pain in multiple joints, cerebral microvascular disease, and having tremor and gait abnormalities. Gabapentin made her tired and she stopped taking.    Labs 6/15/2022 at - hemoglobin A1c, sed rate, KIERA, vitamin E, vitamin B12, folic acid, fasting lipid panel, CK, urine heavy metals, and urine drug screen to be completed. Lipid panel showed total cholesterol 192, HDL 58, triglycerides 152, and .6. Her CK level was normal at 79. Her folic acid level is greater than 20. Her vitamin B12 level was normal at 516. Her vitamin E level was also normal. Her KIERA was negative. Her sed rate was 3. Hemoglobin A1c was 5.2%. He diagnosed her with some ataxia and lack of coordination.     The following portions of the patient's history were reviewed and updated as appropriate: allergies, current medications, past family history, past medical history, past social history, past surgical history and problem list.    Past Medical History:   Diagnosis Date   • Arthritis    • Difficulty walking    • Eczema    • History of anxiety    • History of colonic diverticulitis    • History of emphysema    • Hypertension    • Imbalance     since knee surgery   • Irritable bowel syndrome (IBS)     irritable bowel constipation   • Osteopenia    • Sleep apnea with use of continuous positive airway pressure (CPAP)    • Urinary incontinence    • Use of cane as ambulatory aid        Past Surgical History:   Procedure Laterality Date   •  SECTION      x2   • CHEST TUBE INSERTION  2012    " for emphysema   • CHOLECYSTECTOMY     • DILATATION AND CURETTAGE     • LAPAROSCOPIC TUBAL LIGATION     • OOPHORECTOMY      one side removed   • REPLACEMENT TOTAL KNEE Right 01/2018       Social History     Socioeconomic History   • Marital status:    Tobacco Use   • Smoking status: Never   • Smokeless tobacco: Never   Vaping Use   • Vaping Use: Never used   Substance and Sexual Activity   • Alcohol use: Never   • Drug use: Never   • Sexual activity: Not Currently     Partners: Male     Birth control/protection: Post-menopausal       Family History   Problem Relation Age of Onset   • Hypertension Mother    • Brain cancer Mother    • Dementia Father    • Stroke Father    • Hypertension Father    • Skin cancer Father    • Migraines Maternal Aunt    • Breast cancer Neg Hx    • Ovarian cancer Neg Hx    • Colon cancer Neg Hx           Current Outpatient Medications:   •  aspirin (Aspirin Low Dose) 81 MG EC tablet, Take 1 tablet by mouth Daily., Disp: 90 tablet, Rfl: 3  •  cetirizine (zyrTEC) 10 MG tablet, Take 10 mg by mouth Daily., Disp: , Rfl:   •  esomeprazole (nexIUM) 20 MG capsule, Take 20 mg by mouth Every Morning Before Breakfast., Disp: , Rfl:   •  fluconazole (Diflucan) 150 MG tablet, Take 1 tablet by mouth Daily. Repeat in 3 days, Disp: 2 tablet, Rfl: 0  •  FLUTICASONE PROPIONATE, NASAL, NA, into the nostril(s) as directed by provider., Disp: , Rfl:   •  linaclotide (LINZESS) 290 MCG capsule capsule, Take 72 mcg by mouth Every Morning Before Breakfast., Disp: , Rfl:   •  losartan-hydrochlorothiazide (HYZAAR) 100-12.5 MG per tablet, Take 1 tablet by mouth Daily., Disp: , Rfl:   •  montelukast (SINGULAIR) 10 MG tablet, montelukast 10 mg tablet  Take 1 tablet every day by oral route., Disp: , Rfl:   •  nystatin (MYCOSTATIN) 848886 UNIT/GM cream, Apply 1 application topically to the appropriate area as directed 2 (Two) Times a Day As Needed (Apply to genital area twice daily as needed)., Disp: 30 g, Rfl: 0  •  " nystatin (MYCOSTATIN) 362994 UNIT/GM powder, Apply  topically to the appropriate area as directed 3 (Three) Times a Day. Use under breast folds, Disp: 1 each, Rfl: 6  •  Omega-3 Fatty Acids (FISH OIL) 1000 MG capsule capsule, Take  by mouth Daily With Breakfast., Disp: , Rfl:   •  TobraDex 0.3-0.1 % ophthalmic ointment, , Disp: , Rfl:   •  verapamil SR (CALAN-SR) 180 MG CR tablet, verapamil ER (SR) 180 mg tablet,extended release, Disp: , Rfl:   •  vitamin C (ASCORBIC ACID) 500 MG tablet, Take 500 mg by mouth Daily., Disp: , Rfl:      Review of Systems   Constitutional: Negative for chills, fatigue, fever and unexpected weight change.   HENT: Negative for ear pain, hearing loss, nosebleeds, rhinorrhea and sore throat.    Eyes: Negative for photophobia, pain, discharge, itching and visual disturbance.   Respiratory: Negative for cough, chest tightness, shortness of breath and wheezing.    Cardiovascular: Negative for chest pain, palpitations and leg swelling.   Gastrointestinal: Negative for abdominal pain, blood in stool, constipation, diarrhea, nausea and vomiting.   Genitourinary: Negative for dysuria, frequency, hematuria and urgency.   Musculoskeletal: Positive for gait problem. Negative for arthralgias, back pain, joint swelling, myalgias, neck pain and neck stiffness.   Skin: Negative for rash and wound.   Allergic/Immunologic: Negative for environmental allergies and food allergies.   Neurological: Positive for tremors. Negative for dizziness, seizures, syncope, speech difficulty, weakness, light-headedness, numbness and headaches.   Hematological: Negative for adenopathy. Does not bruise/bleed easily.   Psychiatric/Behavioral: Negative for agitation, confusion, decreased concentration, hallucinations, sleep disturbance and suicidal ideas. The patient is not nervous/anxious.    All other systems reviewed and are negative.       Objective:  /80   Pulse 70   Ht 149.9 cm (59\")   Wt 85.3 kg (188 lb)   " LMP  (LMP Unknown)   SpO2 90%   BMI 37.97 kg/m²     Neurologic Exam     Mental Status   Oriented to person, place, and time.   Speech: speech is normal   Level of consciousness: alert    Cranial Nerves   Cranial nerves II through XII intact.     CN II   Visual acuity: decreased    Motor Exam   Muscle bulk: normal  Overall muscle tone: normal    Strength   Strength 5/5 except as noted.   Reports pain and limited ROM and strength in both knees, hips and entire spine chronic. Mobility limited by arthritis and chronic pain     Gait, Coordination, and Reflexes     Gait  Gait: wide-based (mild antalgia right knee pain, using rolling walker, no shuffling)    Coordination   Finger to nose coordination: normal    Tremor   Resting tremor: present (minimal right hand)  Intention tremor: present (mild right hand holding paper today, none left hand, mild both hand with intention)  Action tremor: absent    Reflexes   Right : 2+  Left : 2+  No shuffling, normal finger and heel taps. No cogwheel rigidity       Physical Exam  Constitutional:       Appearance: Normal appearance. She is obese.      Comments: BMI 38   Neurological:      Mental Status: She is alert and oriented to person, place, and time.      Cranial Nerves: Cranial nerves 2-12 are intact.      Coordination: Finger-Nose-Finger Test normal.   Psychiatric:         Mood and Affect: Mood is anxious.         Speech: Speech normal.         Behavior: Behavior normal.         Thought Content: Thought content normal.         Cognition and Memory: Cognition and memory normal.         Judgment: Judgment normal.         Assessment/Plan:       Diagnoses and all orders for this visit:    1. Ataxia (Primary)  Comments:  continue walker    2. Tremor  Comments:  continues right hand, sinemet made her sick to her stomach this time, f/u [facility] week to discuss options    3. Cerebral microvascular disease  Comments:  continue aspirin 81mg daily, htn management    4.  Chronic pain of multiple joints  Comments:  arthritis left knee, s/p right TKR, both hips  Orders:  -     Ambulatory Referral to Orthopedic Surgery    5. Arthritis  -     Ambulatory Referral to Orthopedic Surgery         She is still having a lot of difficulty with mobility. She restarted the Sinemet, but was only able to take this for 5 to 6 days and then had severe profuse sweating and nausea and was unable to increase the dosing. She is scheduled to see Dr. Tolentino next week. She is not sure what else may be helpful.     She just has a little bit of tremor in the right hand, especially if she is holding a piece of paper or holding her cup of coffee. She is not noticing the tremor anywhere else.     Once she gets up with her walker, she is able to ambulate pretty well, but does have a significant amount of pain in both knees as well as both hips and even her low back. I have suggested a consultation with a different orthopedic specialist. She has been agreeable to me putting in this referral.     I have also discussed retrying physical therapy or water therapy. She is not willing to do the water therapy at this time as she does not have a good location to go to and had a traumatic experience with the prior location. She can call us at any time for an order for this. She is going to follow up in 7 months in clinic for reevaluation of symptoms or sooner if needed.    Total time of visit today is 40 minutes discussing her symptoms and additional options, reviewing [facility] notes, completing exam, discussing findings and recommendations in detail. She is greatly appreciative of the time and discussion.    Reviewed medications, potential side effects and signs and symptoms to report. Discussed risk versus benefits of treatment plan with patient and/or family-including medications, labs and radiology that may be ordered. Addressed questions and concerns during visit. Patient and/or family verbalized understanding and agree  with plan.    AS THE PROVIDER, I PERSONALLY WORE PPE DURING ENTIRE FACE TO FACE ENCOUNTER IN CLINIC WITH THE PATIENT. PATIENT ALSO WORE PPE DURING ENTIRE FACE TO FACE ENCOUNTER EXCEPT FOR A MAX OF 30 SECONDS DURING NEUROLOGICAL EVALUATION OF CRANIAL NERVES AND THEN MASK WAS PLACED BACK OVER PATIENT FACE FOR REMAINDER OF VISIT. I WASHED MY HANDS BEFORE AND AFTER VISIT.    During this visit the following were done:  Labs Reviewed [x]    Labs Ordered []    Radiology Reports Reviewed []    Radiology Ordered []    PCP Records Reviewed []    Referring Provider Records Reviewed []    ER Records Reviewed []    Hospital Records Reviewed []    History Obtained From Family []    Radiology Images Reviewed []    Other Reviewed [x]   movement clinic  Records Requested []      Transcribed from ambient dictation for JUDITH Nye by Tennille Sena.  02/24/23   18:30 EST    Patient or patient representative verbalized consent to the visit recording.  I have personally performed the services described in this document as transcribed by the above individual, and it is both accurate and complete.  JUDITH Nye  2/27/2023  07:07 EST

## 2023-03-08 ENCOUNTER — LAB (OUTPATIENT)
Dept: LAB | Facility: HOSPITAL | Age: 73
End: 2023-03-08
Payer: MEDICARE

## 2023-03-08 ENCOUNTER — OFFICE VISIT (OUTPATIENT)
Dept: ORTHOPEDIC SURGERY | Facility: CLINIC | Age: 73
End: 2023-03-08
Payer: MEDICARE

## 2023-03-08 VITALS
DIASTOLIC BLOOD PRESSURE: 104 MMHG | WEIGHT: 188 LBS | SYSTOLIC BLOOD PRESSURE: 172 MMHG | BODY MASS INDEX: 37.9 KG/M2 | HEIGHT: 59 IN

## 2023-03-08 DIAGNOSIS — M17.12 PRIMARY OSTEOARTHRITIS OF LEFT KNEE: ICD-10-CM

## 2023-03-08 DIAGNOSIS — Z96.651 STATUS POST TOTAL RIGHT KNEE REPLACEMENT USING CEMENT: Primary | ICD-10-CM

## 2023-03-08 DIAGNOSIS — Z96.651 STATUS POST TOTAL RIGHT KNEE REPLACEMENT USING CEMENT: ICD-10-CM

## 2023-03-08 PROCEDURE — 86140 C-REACTIVE PROTEIN: CPT

## 2023-03-08 PROCEDURE — 99204 OFFICE O/P NEW MOD 45 MIN: CPT | Performed by: ORTHOPAEDIC SURGERY

## 2023-03-08 PROCEDURE — 85652 RBC SED RATE AUTOMATED: CPT

## 2023-03-08 PROCEDURE — 36415 COLL VENOUS BLD VENIPUNCTURE: CPT

## 2023-03-08 PROCEDURE — 1159F MED LIST DOCD IN RCRD: CPT | Performed by: ORTHOPAEDIC SURGERY

## 2023-03-08 PROCEDURE — 1160F RVW MEDS BY RX/DR IN RCRD: CPT | Performed by: ORTHOPAEDIC SURGERY

## 2023-03-08 PROCEDURE — 85025 COMPLETE CBC W/AUTO DIFF WBC: CPT

## 2023-03-08 NOTE — PROGRESS NOTES
[facility] [place]    Subjective     Chief Complaint   Patient presents with   • Left Knee - Pain   • Right Knee - Pain        HPI    Juanis Carpenter is a 72 y.o. female who presents with new problem of: bilateral knee pain.  Onset: atraumatic and gradual in nature. The issue has been ongoing for 4 year(s) in the left knee and 10 years in the right knee. Pain is a 6/10 on the pain scale. Pain is described as dull and aching. Associated symptoms include pain and stiffness. The pain is worse with walking, standing, sitting, climbing stairs, leisure and rising from seated position; heat, assistive device (cane/walker), pain medication and/or NSAID, lying down and elevating the extremity improve the pain. Previous treatments have included: bracing, cane/walker, NSAIDS, physical therapy and weight loss.     History of right total knee arthroplasty with Dr. Gonzalez in 2015.  Her knee was never pain-free postoperatively.  She has seen others for second opinions, but those results are not available.  No systemic symptoms.  Left knee pain is from arthritis.  No recent injections.    I have reviewed the following portions of the patient's history and agree with: History of Present Illness and Review of Systems    Patient Active Problem List   Diagnosis   • Ataxia   • Gait abnormality   • DDD (degenerative disc disease), lumbar   • DDD (degenerative disc disease), thoracic   • Cerebral microvascular disease   • Chronic pain of multiple joints   • Neuropathic pain   • History of artificial eye lens   • DDD (degenerative disc disease), cervical   • Essential tremor   • Bruxism   • Tremor   • Arthritis     Past Medical History:   Diagnosis Date   • Arthritis    • Arthritis of back    • Arthritis of neck    • Difficulty walking    • Eczema    • Hip arthrosis    • History of anxiety    • History of colonic diverticulitis    • History of emphysema    • Hypertension    • Imbalance     since knee surgery   •  Irritable bowel syndrome (IBS)     irritable bowel constipation   • Knee swelling    • Osteopenia    • Sleep apnea with use of continuous positive airway pressure (CPAP)    • Urinary incontinence    • Use of cane as ambulatory aid       Past Surgical History:   Procedure Laterality Date   •  SECTION      x2   • CHEST TUBE INSERTION  2012    for emphysema   • CHOLECYSTECTOMY     • DILATATION AND CURETTAGE     • LAPAROSCOPIC TUBAL LIGATION     • OOPHORECTOMY      one side removed   • REPLACEMENT TOTAL KNEE Right 2018    Dr. Gaurang Gonzalez [place], [place]      Family History   Problem Relation Age of Onset   • Hypertension Mother    • Brain cancer Mother    • Cancer Mother    • Dementia Father    • Stroke Father    • Hypertension Father    • Skin cancer Father    • Cancer Father    • Migraines Maternal Aunt    • Breast cancer Neg Hx    • Ovarian cancer Neg Hx    • Colon cancer Neg Hx      Social History     Socioeconomic History   • Marital status:    Tobacco Use   • Smoking status: Never   • Smokeless tobacco: Never   Vaping Use   • Vaping Use: Never used   Substance and Sexual Activity   • Alcohol use: Never   • Drug use: Never   • Sexual activity: Not Currently     Partners: Male     Birth control/protection: Post-menopausal      Current Outpatient Medications on File Prior to Visit   Medication Sig Dispense Refill   • aspirin (Aspirin Low Dose) 81 MG EC tablet Take 1 tablet by mouth Daily. 90 tablet 3   • cetirizine (zyrTEC) 10 MG tablet Take 1 tablet by mouth Daily.     • esomeprazole (nexIUM) 20 MG capsule Take 1 capsule by mouth Every Morning Before Breakfast.     • FLUTICASONE PROPIONATE, NASAL, NA into the nostril(s) as directed by provider.     • linaclotide (LINZESS) 145 MCG capsule capsule      • losartan-hydrochlorothiazide (HYZAAR) 100-12.5 MG per tablet Take 1 tablet by mouth Daily.     • montelukast (SINGULAIR) 10 MG tablet montelukast 10 mg tablet   Take 1 tablet every day by oral  route.     • nystatin (MYCOSTATIN) 139058 UNIT/GM cream Apply 1 application topically to the appropriate area as directed 2 (Two) Times a Day As Needed (Apply to genital area twice daily as needed). 30 g 0   • nystatin (MYCOSTATIN) 096659 UNIT/GM powder Apply  topically to the appropriate area as directed 3 (Three) Times a Day. Use under breast folds 1 each 6   • Omega-3 Fatty Acids (FISH OIL) 1000 MG capsule capsule Take  by mouth Daily With Breakfast.     • TobraDex 0.3-0.1 % ophthalmic ointment      • verapamil SR (CALAN-SR) 180 MG CR tablet verapamil ER (SR) 180 mg tablet,extended release     • vitamin C (ASCORBIC ACID) 500 MG tablet Take 1 tablet by mouth Daily.       No current facility-administered medications on file prior to visit.      Allergies   Allergen Reactions   • Penicillins Swelling   • Budesonide Unknown - Low Severity     Patient states this would have been the inhalant form; does not remember reaction   • Formoterol Unknown - Low Severity     does not remember reaction   • Triamcinolone Unknown - Low Severity     does not remember reaction        Review of Systems   Constitutional: Negative for activity change, appetite change, chills, diaphoresis, fatigue, fever and unexpected weight change.   HENT: Negative for congestion, dental problem, drooling, ear discharge, ear pain, facial swelling, hearing loss, mouth sores, nosebleeds, postnasal drip, rhinorrhea, sinus pressure, sneezing, sore throat, tinnitus, trouble swallowing and voice change.    Eyes: Negative for photophobia, pain, discharge, redness, itching and visual disturbance.   Respiratory: Negative for apnea, cough, choking, chest tightness, shortness of breath, wheezing and stridor.    Cardiovascular: Negative for chest pain, palpitations and leg swelling.   Gastrointestinal: Negative for abdominal distention, abdominal pain, anal bleeding, blood in stool, constipation, diarrhea, nausea, rectal pain and vomiting.   Endocrine: Negative  "for cold intolerance, heat intolerance, polydipsia, polyphagia and polyuria.   Genitourinary: Negative for decreased urine volume, difficulty urinating, dysuria, enuresis, flank pain, frequency, genital sores, hematuria and urgency.   Musculoskeletal: Positive for arthralgias and back pain. Negative for gait problem, joint swelling, myalgias, neck pain and neck stiffness.   Skin: Negative for color change, pallor, rash and wound.   Allergic/Immunologic: Negative for environmental allergies, food allergies and immunocompromised state.   Neurological: Negative for dizziness, tremors, seizures, syncope, facial asymmetry, speech difficulty, weakness, light-headedness, numbness and headaches.   Hematological: Negative for adenopathy. Does not bruise/bleed easily.   Psychiatric/Behavioral: Negative for agitation, behavioral problems, confusion, decreased concentration, dysphoric mood, hallucinations, self-injury, sleep disturbance and suicidal ideas. The patient is not nervous/anxious and is not hyperactive.         Objective      Physical Exam  BP (!) 172/104   Ht 149.9 cm (59\")   Wt 85.3 kg (188 lb)   LMP  (LMP Unknown)   BMI 37.97 kg/m²     Body mass index is 37.97 kg/m².    General:   Mental Status:  Alert   Appearance: Cooperative, in no acute distress   Build and Nutrition: Obese by BMI female   Orientation: Alert and oriented to person, place and time   Posture: Normal   Gait: With a rolling walker, slow gait    Integument:   Right knee: no skin lesions, no rash, no ecchymosis   Left knee: no skin lesions, no rash, no ecchymosis    Neurologic:   Sensation:    Right foot: intact to light touch on the dorsal and plantar aspect    Left foot: intact to light touch on the dorsal and plantar aspect   Motor:  Right lower extremity: 5/5 quadriceps, hamstrings, ankle dorsiflexors, and ankle plantar flexors  Left lower extremity: 5/5 quadriceps, hamstrings, ankle dorsiflexors, and ankle plantar flexors  Vascular:   Right " lower extremity: 2+ dorsalis pedis pulse, prompt capillary refill   Left lower extremity: 2+ dorsalis pedis pulse, prompt capillary refill    Lower Extremities:   Right Knee:    Tenderness:  Medial/lateral joint line tenderness    Effusion:  None    Swelling:  None    Crepitus:  None    Atrophy:  None    Range of motion:  Extension: 0°       Flexion: 115°  Instability:  No varus laxity, no valgus laxity, negative anterior drawer, question mid flexion instability  Deformities:  None  Negative Stinchfield at the hip, and no pain with internal and external rotation   Left Knee:    Tenderness:  Medial/lateral joint line tenderness    Effusion:  None    Swelling:  None    Crepitus: Positive    Atrophy:  None    Range of motion:  Extension: 0°       Flexion: 120°  Instability:  No varus laxity, no valgus laxity, negative anterior drawer  Deformities:  None    Negative Stinchfield at the hip, and no pain with internal and   external rotation      Imaging/Studies      Imaging Results (Last 24 Hours)     ** No results found for the last 24 hours. **          Assessment and Plan     Diagnoses and all orders for this visit:    1. Status post total right knee replacement using cement (Primary)  -     XR Knee 3+ View With [place] Right  -     CBC & Differential; Future  -     C-reactive Protein; Future  -     Sedimentation Rate; Future    2. Primary osteoarthritis of left knee  -     XR Knee 4+ View Left        1. Status post total right knee replacement using cement    2. Primary osteoarthritis of left knee        I reviewed my findings with the patient.  She has a painful right total knee arthroplasty, and I will initiate a work-up to exclude infection.  Inflammatory markers were ordered, and I will see her back in 2 weeks to review.  If that work-up is negative, other options for dealing with her knee can be discussed.  I have a question of mid flexion instability, and I have discussed with her that this is a difficult  diagnosis to make, but is a potential etiology with a painful total knee arthroplasty.  I would have concerns about any attempts at revision surgery, given the bone quality and the size/length of the tibial component which is extensively cemented in the proximal tibia.  We will hold off on any treatment for her left knee at the current time also until we clarify the etiology of her painful right total knee arthroplasty.  Patient understands, and I will see her back in 2 weeks but sooner for any problems.    Return in about 2 weeks (around 3/22/2023).      Yordy Ashley MD  03/09/23  14:59 EST

## 2023-03-09 LAB
BASOPHILS # BLD AUTO: 0.09 10*3/MM3 (ref 0–0.2)
BASOPHILS NFR BLD AUTO: 0.9 % (ref 0–1.5)
CRP SERPL-MCNC: <0.3 MG/DL (ref 0–0.5)
DEPRECATED RDW RBC AUTO: 38.8 FL (ref 37–54)
EOSINOPHIL # BLD AUTO: 0.15 10*3/MM3 (ref 0–0.4)
EOSINOPHIL NFR BLD AUTO: 1.5 % (ref 0.3–6.2)
ERYTHROCYTE [DISTWIDTH] IN BLOOD BY AUTOMATED COUNT: 12 % (ref 12.3–15.4)
ERYTHROCYTE [SEDIMENTATION RATE] IN BLOOD: 10 MM/HR (ref 0–30)
HCT VFR BLD AUTO: 45.1 % (ref 34–46.6)
HGB BLD-MCNC: 15.6 G/DL (ref 12–15.9)
IMM GRANULOCYTES # BLD AUTO: 0.03 10*3/MM3 (ref 0–0.05)
IMM GRANULOCYTES NFR BLD AUTO: 0.3 % (ref 0–0.5)
LYMPHOCYTES # BLD AUTO: 3.07 10*3/MM3 (ref 0.7–3.1)
LYMPHOCYTES NFR BLD AUTO: 31 % (ref 19.6–45.3)
MCH RBC QN AUTO: 31.1 PG (ref 26.6–33)
MCHC RBC AUTO-ENTMCNC: 34.6 G/DL (ref 31.5–35.7)
MCV RBC AUTO: 90 FL (ref 79–97)
MONOCYTES # BLD AUTO: 0.63 10*3/MM3 (ref 0.1–0.9)
MONOCYTES NFR BLD AUTO: 6.4 % (ref 5–12)
NEUTROPHILS NFR BLD AUTO: 5.93 10*3/MM3 (ref 1.7–7)
NEUTROPHILS NFR BLD AUTO: 59.9 % (ref 42.7–76)
NRBC BLD AUTO-RTO: 0 /100 WBC (ref 0–0.2)
PLATELET # BLD AUTO: 258 10*3/MM3 (ref 140–450)
PMV BLD AUTO: 10.9 FL (ref 6–12)
RBC # BLD AUTO: 5.01 10*6/MM3 (ref 3.77–5.28)
WBC NRBC COR # BLD: 9.9 10*3/MM3 (ref 3.4–10.8)

## 2023-04-03 ENCOUNTER — OFFICE VISIT (OUTPATIENT)
Dept: ORTHOPEDIC SURGERY | Facility: CLINIC | Age: 73
End: 2023-04-03
Payer: MEDICARE

## 2023-04-03 VITALS
WEIGHT: 188 LBS | SYSTOLIC BLOOD PRESSURE: 130 MMHG | DIASTOLIC BLOOD PRESSURE: 78 MMHG | HEIGHT: 59 IN | BODY MASS INDEX: 37.9 KG/M2

## 2023-04-03 DIAGNOSIS — G89.29 CHRONIC MIDLINE LOW BACK PAIN, UNSPECIFIED WHETHER SCIATICA PRESENT: ICD-10-CM

## 2023-04-03 DIAGNOSIS — M17.12 PRIMARY OSTEOARTHRITIS OF LEFT KNEE: ICD-10-CM

## 2023-04-03 DIAGNOSIS — M54.50 CHRONIC MIDLINE LOW BACK PAIN, UNSPECIFIED WHETHER SCIATICA PRESENT: ICD-10-CM

## 2023-04-03 DIAGNOSIS — Z96.651 STATUS POST TOTAL RIGHT KNEE REPLACEMENT USING CEMENT: Primary | ICD-10-CM

## 2023-04-03 PROCEDURE — 99213 OFFICE O/P EST LOW 20 MIN: CPT | Performed by: ORTHOPAEDIC SURGERY

## 2023-04-03 PROCEDURE — 1160F RVW MEDS BY RX/DR IN RCRD: CPT | Performed by: ORTHOPAEDIC SURGERY

## 2023-04-03 PROCEDURE — 20610 DRAIN/INJ JOINT/BURSA W/O US: CPT | Performed by: ORTHOPAEDIC SURGERY

## 2023-04-03 PROCEDURE — 1159F MED LIST DOCD IN RCRD: CPT | Performed by: ORTHOPAEDIC SURGERY

## 2023-04-03 RX ORDER — TRIAMCINOLONE ACETONIDE 40 MG/ML
40 INJECTION, SUSPENSION INTRA-ARTICULAR; INTRAMUSCULAR
Status: COMPLETED | OUTPATIENT
Start: 2023-04-03 | End: 2023-04-03

## 2023-04-03 RX ORDER — ROPIVACAINE HYDROCHLORIDE 5 MG/ML
4 INJECTION, SOLUTION EPIDURAL; INFILTRATION; PERINEURAL
Status: COMPLETED | OUTPATIENT
Start: 2023-04-03 | End: 2023-04-03

## 2023-04-03 RX ORDER — VERAPAMIL HYDROCHLORIDE 240 MG/1
1 CAPSULE, EXTENDED RELEASE ORAL DAILY
COMMUNITY
Start: 2023-03-09

## 2023-04-03 RX ORDER — DORZOLAMIDE HYDROCHLORIDE AND TIMOLOL MALEATE 20; 5 MG/ML; MG/ML
SOLUTION/ DROPS OPHTHALMIC
COMMUNITY
Start: 2023-03-15

## 2023-04-03 RX ADMIN — ROPIVACAINE HYDROCHLORIDE 4 ML: 5 INJECTION, SOLUTION EPIDURAL; INFILTRATION; PERINEURAL at 13:52

## 2023-04-03 RX ADMIN — TRIAMCINOLONE ACETONIDE 40 MG: 40 INJECTION, SUSPENSION INTRA-ARTICULAR; INTRAMUSCULAR at 13:52

## 2023-04-03 NOTE — PROGRESS NOTES
Procedure   Large Joint Arthrocentesis: L knee  Date/Time: 4/3/2023 1:52 PM  Consent given by: patient  Site marked: site marked  Timeout: Immediately prior to procedure a time out was called to verify the correct patient, procedure, equipment, support staff and site/side marked as required   Supporting Documentation  Indications: pain   Procedure Details  Location: knee - L knee  Preparation: Patient was prepped and draped in the usual sterile fashion  Needle size: 22 G  Approach: anterolateral  Medications administered: 4 mL ropivacaine 0.5 %; 40 mg triamcinolone acetonide 40 MG/ML  Patient tolerance: patient tolerated the procedure well with no immediate complications

## 2023-04-03 NOTE — PROGRESS NOTES
[facility] [place]    Subjective     Chief Complaint   Patient presents with   • Follow-up     4 week follow up -- status post total right knee replacement using cement (2015) and primary osteoarthritis of left knee         HPI    It has been 4  week(s) since Ms. Carpenter's last visit. She returns to clinic today for follow-up of left knee arthritis and right knee arthroplasty. The issue has been ongoing for 4 year(s) in the left knee and 10 years in the right knee. She rates her pain a 5/10 on the pain scale. Previous/current treatments: bracing, cane/walker, NSAIDS, physical therapy and weight loss. Current symptoms: pain and stiffness. The pain is worse with walking, standing, climbing stairs, rising from seated position and any movement of the joint; resting, sitting, lying down and elevating the extremity improve the pain. Overall, she is doing the same.  Here today for follow-up on the blood work.  No new complaints today.    History of right total knee arthroplasty with Dr. Gonzalez in 2015.  Her knee was never pain-free postoperatively.  She has seen others for second opinions, but those results are not available.  No systemic symptoms.  Left knee pain is from arthritis.  No recent injections.    I have reviewed the following portions of the patient's history and agree with: History of Present Illness and Review of Systems    Patient Active Problem List   Diagnosis   • Ataxia   • Gait abnormality   • DDD (degenerative disc disease), lumbar   • DDD (degenerative disc disease), thoracic   • Cerebral microvascular disease   • Chronic pain of multiple joints   • Neuropathic pain   • History of artificial eye lens   • DDD (degenerative disc disease), cervical   • Essential tremor   • Bruxism   • Tremor   • Arthritis     Past Medical History:   Diagnosis Date   • Arthritis    • Arthritis of back    • Arthritis of neck    • Difficulty walking    • Eczema    • Hip arthrosis    • History of anxiety     • History of colonic diverticulitis    • History of emphysema    • Hypertension    • Imbalance     since knee surgery   • Irritable bowel syndrome (IBS)     irritable bowel constipation   • Knee swelling    • Osteopenia    • Sleep apnea with use of continuous positive airway pressure (CPAP)    • Urinary incontinence    • Use of cane as ambulatory aid       Past Surgical History:   Procedure Laterality Date   •  SECTION      x2   • CHEST TUBE INSERTION  2012    for emphysema   • CHOLECYSTECTOMY     • DILATATION AND CURETTAGE     • LAPAROSCOPIC TUBAL LIGATION     • OOPHORECTOMY      one side removed   • REPLACEMENT TOTAL KNEE Right 2018    Dr. Gaurang Gonzalez [place], [place]      Family History   Problem Relation Age of Onset   • Hypertension Mother    • Brain cancer Mother    • Cancer Mother    • Dementia Father    • Stroke Father    • Hypertension Father    • Skin cancer Father    • Cancer Father    • Migraines Maternal Aunt    • Breast cancer Neg Hx    • Ovarian cancer Neg Hx    • Colon cancer Neg Hx      Social History     Socioeconomic History   • Marital status:    Tobacco Use   • Smoking status: Never   • Smokeless tobacco: Never   Vaping Use   • Vaping Use: Never used   Substance and Sexual Activity   • Alcohol use: Never   • Drug use: Never   • Sexual activity: Not Currently     Partners: Male     Birth control/protection: Post-menopausal      Current Outpatient Medications on File Prior to Visit   Medication Sig Dispense Refill   • aspirin (Aspirin Low Dose) 81 MG EC tablet Take 1 tablet by mouth Daily. 90 tablet 3   • cetirizine (zyrTEC) 10 MG tablet Take 1 tablet by mouth Daily.     • dorzolamide-timolol (COSOPT) 22.3-6.8 MG/ML ophthalmic solution instill 1 drop into both eyes twice a day     • esomeprazole (nexIUM) 20 MG capsule Take 1 capsule by mouth Every Morning Before Breakfast.     • FLUTICASONE PROPIONATE, NASAL, NA into the nostril(s) as directed by provider.     • linaclotide  (LINZESS) 145 MCG capsule capsule      • losartan-hydrochlorothiazide (HYZAAR) 100-12.5 MG per tablet Take 1 tablet by mouth Daily.     • montelukast (SINGULAIR) 10 MG tablet montelukast 10 mg tablet   Take 1 tablet every day by oral route.     • nystatin (MYCOSTATIN) 884646 UNIT/GM cream Apply 1 application topically to the appropriate area as directed 2 (Two) Times a Day As Needed (Apply to genital area twice daily as needed). 30 g 0   • nystatin (MYCOSTATIN) 874081 UNIT/GM powder Apply  topically to the appropriate area as directed 3 (Three) Times a Day. Use under breast folds 1 each 6   • Omega-3 Fatty Acids (FISH OIL) 1000 MG capsule capsule Take  by mouth Daily With Breakfast.     • TobraDex 0.3-0.1 % ophthalmic ointment      • verapamil ER (VERELAN) 240 MG 24 hr capsule Take 1 capsule by mouth Daily.     • vitamin C (ASCORBIC ACID) 500 MG tablet Take 1 tablet by mouth Daily.     • [DISCONTINUED] verapamil SR (CALAN-SR) 180 MG CR tablet verapamil ER (SR) 180 mg tablet,extended release       No current facility-administered medications on file prior to visit.      Allergies   Allergen Reactions   • Penicillins Swelling   • Budesonide Unknown - Low Severity     Patient states this would have been the inhalant form; does not remember reaction   • Formoterol Unknown - Low Severity     does not remember reaction   • Triamcinolone Unknown - Low Severity     does not remember reaction        Review of Systems   Constitutional: Negative for activity change, appetite change, chills, diaphoresis, fatigue, fever and unexpected weight change.   HENT: Negative for congestion, dental problem, drooling, ear discharge, ear pain, facial swelling, hearing loss, mouth sores, nosebleeds, postnasal drip, rhinorrhea, sinus pressure, sneezing, sore throat, tinnitus, trouble swallowing and voice change.    Eyes: Negative for photophobia, pain, discharge, redness, itching and visual disturbance.   Respiratory: Negative for apnea,  "cough, choking, chest tightness, shortness of breath, wheezing and stridor.    Cardiovascular: Negative for chest pain, palpitations and leg swelling.   Gastrointestinal: Negative for abdominal distention, abdominal pain, anal bleeding, blood in stool, constipation, diarrhea, nausea, rectal pain and vomiting.   Endocrine: Negative for cold intolerance, heat intolerance, polydipsia, polyphagia and polyuria.   Genitourinary: Negative for decreased urine volume, difficulty urinating, dysuria, enuresis, flank pain, frequency, genital sores, hematuria and urgency.   Musculoskeletal: Positive for arthralgias. Negative for back pain, gait problem, joint swelling, myalgias, neck pain and neck stiffness.   Skin: Negative for color change, pallor, rash and wound.   Allergic/Immunologic: Negative for environmental allergies, food allergies and immunocompromised state.   Neurological: Negative for dizziness, tremors, seizures, syncope, facial asymmetry, speech difficulty, weakness, light-headedness, numbness and headaches.   Hematological: Negative for adenopathy. Does not bruise/bleed easily.   Psychiatric/Behavioral: Negative for agitation, behavioral problems, confusion, decreased concentration, dysphoric mood, hallucinations, self-injury, sleep disturbance and suicidal ideas. The patient is not nervous/anxious and is not hyperactive.         Objective      Physical Exam  /78   Ht 149.9 cm (59\")   Wt 85.3 kg (188 lb)   LMP  (LMP Unknown)   BMI 37.97 kg/m²     Body mass index is 37.97 kg/m².    General:   Mental Status:  Alert   Appearance: Cooperative, in no acute distress   Build and Nutrition: Obese by BMI female   Orientation: Alert and oriented to person, place and time   Posture: Normal   Gait: Slow with a rolling walker    Lower Extremities:              Right Knee:                          Tenderness:    Medial/lateral joint line tenderness                          Effusion:          None                     "      Swelling:          None                          Crepitus:          None                          Atrophy:           None                          Range of motion:        Extension:       0°                                                              Flexion:           115°  Instability:        No varus laxity, no valgus laxity, negative anterior drawer, question mid flexion instability  Deformities:     None              Left Knee:                          Tenderness:    Medial/lateral joint line tenderness                          Effusion:          None                          Swelling:          None                          Crepitus:          Positive                          Atrophy:           None                          Range of motion:        Extension:       0°                                                              Flexion:           120°  Instability:        No varus laxity, no valgus laxity, negative anterior drawer  Deformities:     None      Imaging/Studies  Imaging Results (Last 24 Hours)     ** No results found for the last 24 hours. **        Lab Results   Component Value Date    SEDRATE 10 03/08/2023    WBC 9.90 03/08/2023    CRP <0.30 03/08/2023         Assessment and Plan     Diagnoses and all orders for this visit:    1. Status post total right knee replacement using cement (Primary)    2. Primary osteoarthritis of left knee  -     Large Joint Arthrocentesis: L knee    3. Chronic midline low back pain, unspecified whether sciatica present  -     Ambulatory Referral to Orthopedic Surgery        1. Status post total right knee replacement using cement    2. Primary osteoarthritis of left knee    3. Chronic midline low back pain, unspecified whether sciatica present        I reviewed my findings with the patient.  Inflammatory markers are normal.  Low likelihood of infection.  I suspect that she does have an element of mid flexion instability in her right total knee arthroplasty, but  again I am very concerned about any attempts at revision knee surgery in light of the bone quality and size of the components.  She is not keen on any surgical intervention also.  She would like to have an injection for her left knee today, and this was provided.  I will see her back in 4 months, but sooner for any problems.  She is having overall instability with ambulation, and thinks it could be coming from her back.  I will make a referral for her to see a spine specialist.    Procedure Note:  The potential benefits of performing a therapeutic left knee joint injection, as well as potential risks (including, but not limited to infection, swelling, pain, bleeding, bruising, nerve/blood vessel damage, skin color changes, transient elevation in blood glucose levels, and fat atrophy) were discussed with the patient.  After informed consent, timeout procedure was performed, and the skin on the left knee was prepped with chlorhexidine soap and alcohol, after which ethyl chloride was applied to the skin at the injection site. Via the anterolateral approach, 1ml of Kenalog 40mg/ml mixed with 4ml 0.5% ropivacaine plain was injected into the knee joint.  The patient tolerated the procedure well, experiencing 90% improvement a few minutes following the injection. There were no complications.  Band-Aid was applied to the injection site. Post-procedural instructions were given to the patient and/or their caregiver.      Return in about 4 months (around 8/3/2023).      Yordy Ashley MD  04/03/23  14:05 EDT

## 2023-05-28 DIAGNOSIS — I67.89 CEREBRAL MICROVASCULAR DISEASE: ICD-10-CM

## 2023-05-30 NOTE — TELEPHONE ENCOUNTER
Rx Refill Note  Requested Prescriptions     Pending Prescriptions Disp Refills   • [facility] Aspirin Low Dose 81 MG EC tablet [Pharmacy Med Name: [facility] ASPIRIN EC 81 MG TABLET] 90 tablet 3     Sig: TAKE 1 TABLET BY MOUTH EVERY DAY      Last office visit with prescribing clinician: 2/24/2023   Last telemedicine visit with prescribing clinician: Visit date not found   Next office visit with prescribing clinician: 9/7/2023                         Would you like a call back once the refill request has been completed: [] Yes [] No    If the office needs to give you a call back, can they leave a voicemail: [] Yes [] No    Alondra Collins CMA  05/30/23, 08:52 EDT

## 2023-05-31 RX ORDER — SALICYLIC ACID 40 %
ADHESIVE PATCH, MEDICATED TOPICAL
Qty: 90 TABLET | Refills: 3 | Status: SHIPPED | OUTPATIENT
Start: 2023-05-31

## 2023-08-07 ENCOUNTER — OFFICE VISIT (OUTPATIENT)
Dept: ORTHOPEDIC SURGERY | Facility: CLINIC | Age: 73
End: 2023-08-07
Payer: MEDICARE

## 2023-08-07 VITALS
HEIGHT: 59 IN | SYSTOLIC BLOOD PRESSURE: 130 MMHG | DIASTOLIC BLOOD PRESSURE: 82 MMHG | WEIGHT: 190.6 LBS | BODY MASS INDEX: 38.42 KG/M2

## 2023-08-07 DIAGNOSIS — M17.12 PRIMARY OSTEOARTHRITIS OF LEFT KNEE: ICD-10-CM

## 2023-08-07 DIAGNOSIS — Z96.651 STATUS POST TOTAL RIGHT KNEE REPLACEMENT USING CEMENT: Primary | ICD-10-CM

## 2023-08-07 PROCEDURE — 1160F RVW MEDS BY RX/DR IN RCRD: CPT | Performed by: ORTHOPAEDIC SURGERY

## 2023-08-07 PROCEDURE — 1159F MED LIST DOCD IN RCRD: CPT | Performed by: ORTHOPAEDIC SURGERY

## 2023-08-07 PROCEDURE — 99213 OFFICE O/P EST LOW 20 MIN: CPT | Performed by: ORTHOPAEDIC SURGERY

## 2023-08-07 RX ORDER — TIMOLOL MALEATE 5 MG/ML
SOLUTION/ DROPS OPHTHALMIC
COMMUNITY
Start: 2023-05-19

## 2023-08-07 RX ORDER — BRIMONIDINE TARTRATE 2 MG/ML
SOLUTION/ DROPS OPHTHALMIC
COMMUNITY
Start: 2023-05-19

## 2023-08-07 NOTE — PROGRESS NOTES
[facility] Note    Subjective     Chief Complaint   Patient presents with    Follow-up     4 month f/u-- status post total right knee replacement using cement (2015) and primary osteoarthritis of left knee         HPI    It has been 4  month(s) since Ms. Carpenter's last visit. She returns to clinic today for follow-up of bilateral knee pain. The issue has been ongoing for 6 year(s). She rates her pain a 6/10 on the pain scale. Previous/current treatments: bracing, cane/walker, NSAIDS, physical therapy, and steroid injection (last injection 4/3/23). Current symptoms: pain and stiffness. The pain is worse with walking, standing, climbing stairs, and rising from seated position; resting, sitting, ice, pain medication and/or NSAID, and elevating the extremity improve the pain. Overall, she is doing the same.     History of right total knee arthroplasty with Dr. Gonzalez in 2015, with pain in her knee since the time of the surgery, and has sought second opinions from others in the past.  Work-up for infection previously was negative.    She did have an injection in her left knee on the last visit, which provided relief.    I have reviewed the following portions of the patient's history and agree with: History of Present Illness and Review of Systems    Patient Active Problem List   Diagnosis    Ataxia    Gait abnormality    DDD (degenerative disc disease), lumbar    DDD (degenerative disc disease), thoracic    Cerebral microvascular disease    Chronic pain of multiple joints    Neuropathic pain    History of artificial eye lens    DDD (degenerative disc disease), cervical    Essential tremor    Bruxism    Tremor    Arthritis     Past Medical History:   Diagnosis Date    Arthritis     Arthritis of back     Arthritis of neck     Difficulty walking     Eczema     Hip arthrosis     History of anxiety     History of colonic diverticulitis     History of emphysema     Hypertension     Imbalance     since  knee surgery    Irritable bowel syndrome (IBS)     irritable bowel constipation    Knee swelling     Osteopenia     Sleep apnea with use of continuous positive airway pressure (CPAP)     Urinary incontinence     Use of cane as ambulatory aid       Past Surgical History:   Procedure Laterality Date     SECTION      x2    CHEST TUBE INSERTION  2012    for emphysema    CHOLECYSTECTOMY      DILATATION AND CURETTAGE      JOINT REPLACEMENT  right knee 2018    LAPAROSCOPIC TUBAL LIGATION      OOPHORECTOMY      one side removed    REPLACEMENT TOTAL KNEE Right 2018    Dr. Gaurang Gonzalez - [place], [place]      Family History   Problem Relation Age of Onset    Hypertension Mother     Brain cancer Mother     Cancer Mother     Dementia Father     Stroke Father     Hypertension Father     Skin cancer Father     Cancer Father     Migraines Maternal Aunt     Breast cancer Neg Hx     Ovarian cancer Neg Hx     Colon cancer Neg Hx      Social History     Socioeconomic History    Marital status:    Tobacco Use    Smoking status: Never    Smokeless tobacco: Never   Vaping Use    Vaping Use: Never used   Substance and Sexual Activity    Alcohol use: Never    Drug use: Never    Sexual activity: Not Currently     Partners: Male     Birth control/protection: Post-menopausal      Current Outpatient Medications on File Prior to Visit   Medication Sig Dispense Refill    brimonidine (ALPHAGAN) 0.2 % ophthalmic solution       timolol (TIMOPTIC) 0.5 % ophthalmic solution       cetirizine (zyrTEC) 10 MG tablet Take 1 tablet by mouth Daily.      [facility] Aspirin Low Dose 81 MG EC tablet TAKE 1 TABLET BY MOUTH EVERY DAY 90 tablet 3    esomeprazole (nexIUM) 20 MG capsule Take 1 capsule by mouth Every Morning Before Breakfast.      FLUTICASONE PROPIONATE, NASAL, NA into the nostril(s) as directed by provider.      linaclotide (LINZESS) 145 MCG capsule capsule       losartan-hydrochlorothiazide (HYZAAR) 100-12.5 MG per tablet Take 1 tablet  by mouth Daily.      montelukast (SINGULAIR) 10 MG tablet montelukast 10 mg tablet   Take 1 tablet every day by oral route.      Omega-3 Fatty Acids (FISH OIL) 1000 MG capsule capsule Take  by mouth Daily With Breakfast.      TobraDex 0.3-0.1 % ophthalmic ointment       verapamil ER (VERELAN) 240 MG 24 hr capsule Take 1 capsule by mouth Daily.      vitamin C (ASCORBIC ACID) 500 MG tablet Take 1 tablet by mouth Daily.      [DISCONTINUED] dorzolamide-timolol (COSOPT) 22.3-6.8 MG/ML ophthalmic solution instill 1 drop into both eyes twice a day      [DISCONTINUED] nystatin (MYCOSTATIN) 804420 UNIT/GM cream Apply 1 application topically to the appropriate area as directed 2 (Two) Times a Day As Needed (Apply to genital area twice daily as needed). 30 g 0    [DISCONTINUED] nystatin (MYCOSTATIN) 244987 UNIT/GM powder Apply  topically to the appropriate area as directed 3 (Three) Times a Day. Use under breast folds 1 each 6     No current facility-administered medications on file prior to visit.      Allergies   Allergen Reactions    Penicillins Swelling    Budesonide Unknown - Low Severity     Patient states this would have been the inhalant form; does not remember reaction    Formoterol Unknown - Low Severity     does not remember reaction    Triamcinolone Unknown - Low Severity     does not remember reaction        Review of Systems   Constitutional:  Negative for activity change, appetite change, chills, diaphoresis, fatigue, fever and unexpected weight change.   HENT:  Negative for congestion, dental problem, drooling, ear discharge, ear pain, facial swelling, hearing loss, mouth sores, nosebleeds, postnasal drip, rhinorrhea, sinus pressure, sneezing, sore throat, tinnitus, trouble swallowing and voice change.    Eyes:  Negative for photophobia, pain, discharge, redness, itching and visual disturbance.   Respiratory:  Negative for apnea, cough, choking, chest tightness, shortness of breath, wheezing and stridor.   "  Cardiovascular:  Negative for chest pain, palpitations and leg swelling.   Gastrointestinal:  Negative for abdominal distention, abdominal pain, anal bleeding, blood in stool, constipation, diarrhea, nausea, rectal pain and vomiting.   Endocrine: Negative for cold intolerance, heat intolerance, polydipsia, polyphagia and polyuria.   Genitourinary:  Negative for decreased urine volume, difficulty urinating, dysuria, enuresis, flank pain, frequency, genital sores, hematuria and urgency.   Musculoskeletal:  Positive for arthralgias. Negative for back pain, gait problem, joint swelling, myalgias, neck pain and neck stiffness.   Skin:  Negative for color change, pallor, rash and wound.   Allergic/Immunologic: Negative for environmental allergies, food allergies and immunocompromised state.   Neurological:  Negative for dizziness, tremors, seizures, syncope, facial asymmetry, speech difficulty, weakness, light-headedness, numbness and headaches.   Hematological:  Negative for adenopathy. Does not bruise/bleed easily.   Psychiatric/Behavioral:  Negative for agitation, behavioral problems, confusion, decreased concentration, dysphoric mood, hallucinations, self-injury, sleep disturbance and suicidal ideas. The patient is not nervous/anxious and is not hyperactive.       Objective      Physical Exam  /82   Ht 148.6 cm (58.5\")   Wt 86.5 kg (190 lb 9.6 oz)   LMP  (LMP Unknown)   BMI 39.16 kg/mý     Body mass index is 39.16 kg/mý.    General:   Mental Status:  Alert   Appearance: Cooperative, in no acute distress   Build and Nutrition: Obese by BMI female   Orientation: Alert and oriented to person, place and time   Posture: Normal   Gait: With a rolling walker    Lower Extremities:              Right Knee:                          Tenderness:    Medial joint line tenderness                          Effusion:          None                          Swelling:          None                          Crepitus:          " None                          Atrophy:           None                          Range of motion:        Extension:       0ø                                                              Flexion:           115ø  Instability:        No varus laxity, no valgus laxity, negative anterior drawer  Deformities:     None              Left Knee:                          Tenderness:    Medial joint line tenderness                          Effusion:          None                          Swelling:          None                          Crepitus:          Positive                          Atrophy:           None                          Range of motion:        Extension:       0ø                                                              Flexion:           120ø  Instability:        No varus laxity, no valgus laxity, negative anterior drawer  Deformities:     None    Imaging/Studies  Imaging Results (Last 24 Hours)       ** No results found for the last 24 hours. **          No new imaging today.    Assessment and Plan     Diagnoses and all orders for this visit:    1. Status post total right knee replacement using cement (Primary)    2. Primary osteoarthritis of left knee        1. Status post total right knee replacement using cement    2. Primary osteoarthritis of left knee        Reviewed my findings with the patient.  Her left knee responded well to the intra-articular knee injection.  She may repeat that injection in the future if she has flareups down the road.  Regarding her right knee, we have previously discussed the possibility of medial mid flexion instability, but revision knee surgery would be fairly complex if considered.  She is not keen on any further surgical intervention.  I do believe that she has a significant element this coming from her back.  She may benefit from seeing a pain management specialist, and she is going to discuss this with her primary care physician.    Return if symptoms worsen or fail to  improve.      Yordy Ashley MD  08/07/23  13:29 EDT

## 2023-11-06 ENCOUNTER — TRANSCRIBE ORDERS (OUTPATIENT)
Dept: ADMINISTRATIVE | Facility: HOSPITAL | Age: 73
End: 2023-11-06
Payer: MEDICARE

## 2023-11-06 DIAGNOSIS — Z12.31 VISIT FOR SCREENING MAMMOGRAM: Primary | ICD-10-CM

## 2023-12-19 ENCOUNTER — OFFICE VISIT (OUTPATIENT)
Dept: OBSTETRICS AND GYNECOLOGY | Facility: CLINIC | Age: 73
End: 2023-12-19
Payer: MEDICARE

## 2023-12-19 VITALS
SYSTOLIC BLOOD PRESSURE: 140 MMHG | DIASTOLIC BLOOD PRESSURE: 88 MMHG | BODY MASS INDEX: 39.88 KG/M2 | WEIGHT: 197.8 LBS | HEIGHT: 59 IN

## 2023-12-19 DIAGNOSIS — N95.9 MENOPAUSAL AND PERIMENOPAUSAL DISORDER: Primary | ICD-10-CM

## 2023-12-19 DIAGNOSIS — Z12.31 ENCOUNTER FOR SCREENING MAMMOGRAM FOR MALIGNANT NEOPLASM OF BREAST: ICD-10-CM

## 2023-12-19 NOTE — PROGRESS NOTES
Gynecologic Annual Exam Note        GYN Annual Exam     CC - Here for annual exam.        HPI  Juanis Carpenter is a 72 y.o. female, , who presents for poss perimenopausal state or edelmira menopause and the sx associated  She is postmenopausal. Denies vaginal bleeding.  Patient reports problems with:  None . There were no changes to her medical or surgical history since her last visit.. Partner Status: Marital Status: .  She is is not currently sexually active. STD testing recommendations have been explained to the patient and she does not desire STD testing.Has sx of hot flashes and atrophic vaginitis    Additional OB/GYN History   On HRT? No    Last Pap : 12/15/2022. Results: negative. HPV: not done.   Last Completed Pap Smear            PAP SMEAR (Every 2 Years) Next due on 12/15/2024      12/15/2022  LIQUID-BASED PAP SMEAR, [facility] ([facility])    2020  Pap IG, Rfx HPV ASCU    2018  Done - in [place] (negative)                  History of abnormal Pap smear: no  Family history of uterine, colon, breast, or ovarian cancer: no  Performs monthly Self-Breast Exam: no  Last mammogram: 2022. Done at . Negative. Has one scheduled for the end of this month    Last Completed Mammogram       This patient has no relevant Health Maintenance data.          Last colonoscopy: has had a colonoscopy 1 year(s) ago.    Last Completed Colonoscopy       This patient has no relevant Health Maintenance data.              Last bone density scan (DEXA): On 21 and results were Osteopenia   Exercises Regularly: no  Feelings of Anxiety or Depression: no      Tobacco Usage?: No       Current Outpatient Medications:     brimonidine (ALPHAGAN) 0.2 % ophthalmic solution, , Disp: , Rfl:     cetirizine (zyrTEC) 10 MG tablet, Take 1 tablet by mouth Daily., Disp: , Rfl:     [employer]S Aspirin Low Dose 81 MG EC tablet, TAKE 1 TABLET BY MOUTH EVERY DAY, Disp: 90 tablet, Rfl: 3    esomeprazole (nexIUM) 20 MG  capsule, Take 1 capsule by mouth Every Morning Before Breakfast., Disp: , Rfl:     FLUTICASONE PROPIONATE, NASAL, NA, into the nostril(s) as directed by provider., Disp: , Rfl:     losartan-hydrochlorothiazide (HYZAAR) 100-12.5 MG per tablet, Take 1 tablet by mouth Daily., Disp: , Rfl:     montelukast (SINGULAIR) 10 MG tablet, montelukast 10 mg tablet  Take 1 tablet every day by oral route., Disp: , Rfl:     Omega-3 Fatty Acids (FISH OIL) 1000 MG capsule capsule, Take  by mouth Daily With Breakfast., Disp: , Rfl:     timolol (TIMOPTIC) 0.5 % ophthalmic solution, , Disp: , Rfl:     TobraDex 0.3-0.1 % ophthalmic ointment, , Disp: , Rfl:     verapamil ER (VERELAN) 240 MG 24 hr capsule, Take 1 capsule by mouth Daily., Disp: , Rfl:     vitamin C (ASCORBIC ACID) 500 MG tablet, Take 1 tablet by mouth Daily., Disp: , Rfl:     linaclotide (LINZESS) 145 MCG capsule capsule, , Disp: , Rfl:     Patient denies the need for medication refills today.    OB History          4    Para   2    Term   2       0    AB   2    Living   2         SAB   2    IAB   0    Ectopic   0    Molar   0    Multiple   0    Live Births   2                Past Medical History:   Diagnosis Date    Arthritis     Arthritis of back     Arthritis of neck     Difficulty walking     Eczema     Essential tremor 2020    Hip arthrosis     History of anxiety     History of colonic diverticulitis     History of emphysema     Hypertension     Imbalance     since knee surgery    Irritable bowel syndrome (IBS)     irritable bowel constipation    Knee swelling     Osteopenia     Sleep apnea with use of continuous positive airway pressure (CPAP)     Urinary incontinence     Use of cane as ambulatory aid         Past Surgical History:   Procedure Laterality Date     SECTION      x2    CHEST TUBE INSERTION  2012    for emphysema    CHOLECYSTECTOMY      DILATATION AND CURETTAGE      JOINT REPLACEMENT  right knee 2018    LAPAROSCOPIC TUBAL  "LIGATION      OOPHORECTOMY      one side removed    REPLACEMENT TOTAL KNEE Right 01/2018    Dr. Gaurang Gonzalez - [place], [place]       Health Maintenance   Topic Date Due    TDAP/TD VACCINES (1 - Tdap) Never done    ZOSTER VACCINE (1 of 2) Never done    Pneumococcal Vaccine 65+ (1 - PCV) Never done    HEPATITIS C SCREENING  Never done    ANNUAL WELLNESS VISIT  Never done    DXA SCAN  11/26/2020    BMI FOLLOWUP  06/05/2021    COLORECTAL CANCER SCREENING  12/03/2022    MAMMOGRAM  12/06/2023    PAP SMEAR  12/15/2024    COVID-19 Vaccine  Completed    INFLUENZA VACCINE  Completed       The additional following portions of the patient's history were reviewed and updated as appropriate: allergies, current medications, past family history, past medical history, past social history, past surgical history, and problem list.    Review of Systems   All other systems reviewed and are negative.      I have reviewed and agree with the HPI, ROS, and historical information as entered above. Cm Falk MD      Objective   /88   Ht 148.6 cm (58.5\")   Wt 89.7 kg (197 lb 12.8 oz)   LMP  (LMP Unknown)   BMI 40.63 kg/m²     Physical Exam  Vitals and nursing note reviewed. Exam conducted with a chaperone present.   Constitutional:       Appearance: She is well-developed.   HENT:      Head: Normocephalic and atraumatic.   Neck:      Thyroid: No thyroid mass or thyromegaly.   Cardiovascular:      Rate and Rhythm: Normal rate and regular rhythm.      Heart sounds: No murmur heard.  Pulmonary:      Effort: Pulmonary effort is normal. No retractions.      Breath sounds: Normal breath sounds. No wheezing, rhonchi or rales.   Chest:      Chest wall: No mass or tenderness.   Breasts:     Right: Normal. No mass, nipple discharge, skin change or tenderness.      Left: Normal. No mass, nipple discharge, skin change or tenderness.   Abdominal:      General: Bowel sounds are normal.      Palpations: Abdomen is soft. Abdomen is not rigid. " There is no mass.      Tenderness: There is no abdominal tenderness. There is no guarding.      Hernia: No hernia is present. There is no hernia in the left inguinal area.   Genitourinary:     Labia:         Right: No rash, tenderness or lesion.         Left: No rash, tenderness or lesion.       Vagina: Normal. No vaginal discharge or lesions.      Cervix: No cervical motion tenderness, discharge, lesion or cervical bleeding.      Uterus: Normal. Not enlarged, not fixed and not tender.       Adnexa:         Right: No mass or tenderness.          Left: No mass or tenderness.        Rectum: No external hemorrhoid.   Musculoskeletal:      Cervical back: Normal range of motion. No muscular tenderness.   Neurological:      Mental Status: She is alert and oriented to person, place, and time.   Psychiatric:         Behavior: Behavior normal.          Assessment and Plan    Problem List Items Addressed This Visit    None  Visit Diagnoses       Menopausal and perimenopausal disorder    -  Primary            Counselled about menopause   Reviewed monthly self breast exams.  Instructed to call with lumps, pain, or breast discharge.  Yearly mammograms ordered.  Ordered mammogram today.  Recommended use of Vitamin D and getting adequate calcium in her diet. (1500mg)  Reviewed exercise as a preventative health measures.   Reviewed BMI and weight loss as preventative health measures.   Colonoscopy recommended.  Symptoms of menopausal transition reviewed with patient.  Reviewed risks/benefits of OTC, non-hormonal and hormonal treatment for vasomotor and other menopausal symptoms.  RTC in 1 year or PRN with problems.  Other:    No follow-ups on file.         Cm Falk MD  12/19/2023

## 2023-12-27 ENCOUNTER — HOSPITAL ENCOUNTER (OUTPATIENT)
Dept: MAMMOGRAPHY | Facility: HOSPITAL | Age: 73
Discharge: HOME | End: 2023-12-27
Admitting: OBSTETRICS & GYNECOLOGY
Payer: MEDICARE

## 2023-12-27 DIAGNOSIS — Z12.31 VISIT FOR SCREENING MAMMOGRAM: ICD-10-CM

## 2023-12-27 PROCEDURE — 77067 SCR MAMMO BI INCL CAD: CPT

## 2023-12-27 PROCEDURE — 77063 BREAST TOMOSYNTHESIS BI: CPT

## 2023-12-29 LAB — REF LAB TEST METHOD: NORMAL

## 2024-01-02 ENCOUNTER — OFFICE VISIT (OUTPATIENT)
Dept: NEUROSURGERY | Facility: CLINIC | Age: 74
End: 2024-01-02
Payer: MEDICARE

## 2024-01-02 VITALS — WEIGHT: 198.4 LBS | TEMPERATURE: 97.3 F | BODY MASS INDEX: 40 KG/M2 | HEIGHT: 59 IN

## 2024-01-02 DIAGNOSIS — M47.819 SPINAL ARTHRITIS: ICD-10-CM

## 2024-01-02 DIAGNOSIS — M54.9 MECHANICAL BACK PAIN: Primary | ICD-10-CM

## 2024-01-02 PROCEDURE — 99203 OFFICE O/P NEW LOW 30 MIN: CPT | Performed by: NEUROLOGICAL SURGERY

## 2024-01-02 PROCEDURE — 1159F MED LIST DOCD IN RCRD: CPT | Performed by: NEUROLOGICAL SURGERY

## 2024-01-02 PROCEDURE — 1160F RVW MEDS BY RX/DR IN RCRD: CPT | Performed by: NEUROLOGICAL SURGERY

## 2024-01-02 RX ORDER — DULOXETIN HYDROCHLORIDE 30 MG/1
30 CAPSULE, DELAYED RELEASE ORAL DAILY
COMMUNITY
Start: 2023-09-01

## 2024-01-02 RX ORDER — NEOMYCIN SULFATE, POLYMYXIN B SULFATE, AND DEXAMETHASONE 3.5; 10000; 1 MG/G; [USP'U]/G; MG/G
OINTMENT OPHTHALMIC
COMMUNITY
Start: 2023-10-24

## 2024-01-02 RX ORDER — GUAIFENESIN, PSEUDOEPHEDRINE HYDROCHLORIDE 600; 60 MG/1; MG/1
TABLET, EXTENDED RELEASE ORAL
COMMUNITY

## 2024-01-02 RX ORDER — FUROSEMIDE 20 MG/1
TABLET ORAL
COMMUNITY

## 2024-01-02 RX ORDER — IBUPROFEN 600 MG/1
TABLET ORAL
COMMUNITY

## 2024-01-02 NOTE — PROGRESS NOTES
Patient: Juanis Carpenter  : 1950    Primary Care Provider: Lauri Laboy MD    Requesting Provider: As above        History    Chief Complaint: Imbalance as well as mid and low back pain.    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  She complains of generalized stiffness in her mid and low back.  If she stands too long then she gets pain in her mid and low back.  She is followed by neurology at the [facility] for a right upper extremity tremor.  If she stands too long then she really hurts all over including her knees.  She has no numbness.  Her symptoms also bother her more if she sits too long.  She has done 2 courses of physical therapy to no avail.    Review of Systems   Constitutional:  Negative for activity change, appetite change, chills, diaphoresis, fatigue, fever and unexpected weight change.   HENT:  Positive for hearing loss. Negative for congestion, dental problem, drooling, ear discharge, ear pain, facial swelling, mouth sores, nosebleeds, postnasal drip, rhinorrhea, sinus pressure, sinus pain, sneezing, sore throat, tinnitus, trouble swallowing and voice change.    Eyes:  Negative for photophobia, pain, discharge, redness, itching and visual disturbance.   Respiratory:  Positive for apnea. Negative for cough, choking, chest tightness, shortness of breath, wheezing and stridor.    Cardiovascular:  Negative for chest pain, palpitations and leg swelling.   Gastrointestinal:  Negative for abdominal distention, abdominal pain, anal bleeding, blood in stool, constipation, diarrhea, nausea, rectal pain and vomiting.   Endocrine: Negative for cold intolerance, heat intolerance, polydipsia, polyphagia and polyuria.   Genitourinary:  Negative for decreased urine volume, difficulty urinating, dyspareunia, dysuria, enuresis, flank pain, frequency, genital sores, hematuria,  "menstrual problem, pelvic pain, urgency, vaginal bleeding, vaginal discharge and vaginal pain.   Musculoskeletal:  Positive for arthralgias, back pain, gait problem and myalgias. Negative for joint swelling, neck pain and neck stiffness.   Skin:  Negative for color change, pallor, rash and wound.   Allergic/Immunologic: Negative for environmental allergies, food allergies and immunocompromised state.   Neurological:  Negative for dizziness, tremors, seizures, syncope, facial asymmetry, speech difficulty, weakness, light-headedness, numbness and headaches.   Hematological:  Negative for adenopathy. Does not bruise/bleed easily.   Psychiatric/Behavioral:  Negative for agitation, behavioral problems, confusion, decreased concentration, dysphoric mood, hallucinations, self-injury, sleep disturbance and suicidal ideas. The patient is not nervous/anxious and is not hyperactive.        The patient's past medical history, past surgical history, family history, and social history have been reviewed at length in the electronic medical record.      Physical Exam:   Temp 97.3 °F (36.3 °C) (Infrared)   Ht 148.6 cm (58.5\")   Wt 90 kg (198 lb 6.4 oz)   LMP  (LMP Unknown)   BMI 40.76 kg/m²   CONSTITUTIONAL: Patient is well-nourished, pleasant and appears stated age.  MUSCULOSKELETAL:  Straight leg raising is negative.  Valentin's Sign is negative.  ROM in the low back is somewhat limited in all directions.  Tenderness in the back to palpation is not observed.  He is very unsteady trying to get down off of the examination table.  NEUROLOGICAL:  Orientation, memory, attention span, language function, and cognition have been examined and are intact.  Strength is intact in the lower extremities to direct testing.  Muscle tone is normal throughout.  Station and gait are unsteady and apprehensive.  Sensation is intact to light touch testing throughout.  Deep tendon reflexes are 1+ and symmetrical.  Coordination is intact.      Medical " Decision Making    Data Review:   (All imaging studies were personally reviewed unless stated otherwise)  MRI of the lumbar spine dated 11/16/2023 demonstrates a very trace offset of L4 and L5 where there is moderate stenosis.  There is some diffuse degenerative disc disease and facet arthropathy.  High-grade stenosis is not observed.    The report for brain MRI from the [facility] suggest extensive small vessel ischemic change and a developmental venous anomaly within the enrrique.    Diagnosis:   The patient harbors mechanical low back pain.  There could be an element of neurogenic claudication but lumbar stenosis will not cause imbalance that she complains of and that it is visible on her examination.  I suspect her imbalance issues are probably a result of multiple issues.    Treatment Options:   The patient does not think that her back pain is severe enough to warrant surgical intervention so at this point I will not pursue further workup.  I do not have a solution for her balance issues.  If she develops more positional back pain and she will notify me and I will set up a lumbar CT myelogram with upright images.      Scribed for Dieter Hudson MD by Dieter Hudson MD on 1/2/2024 13:41 EST      I, Dr. Hudson, personally performed the services described in the documentation, as scribed in my presence, and it is both accurate and complete.

## 2024-11-11 ENCOUNTER — TRANSCRIBE ORDERS (OUTPATIENT)
Dept: ADMINISTRATIVE | Facility: HOSPITAL | Age: 74
End: 2024-11-11
Payer: MEDICARE

## 2024-11-11 DIAGNOSIS — Z12.31 SCREENING MAMMOGRAM FOR BREAST CANCER: Primary | ICD-10-CM

## 2024-12-24 LAB
NCCN CRITERIA FLAG: NORMAL
TYRER CUZICK SCORE: 2.6

## 2024-12-26 ENCOUNTER — OFFICE VISIT (OUTPATIENT)
Dept: OBSTETRICS AND GYNECOLOGY | Facility: CLINIC | Age: 74
End: 2024-12-26
Payer: MEDICARE

## 2024-12-26 VITALS
HEIGHT: 59 IN | BODY MASS INDEX: 42.78 KG/M2 | WEIGHT: 212.2 LBS | SYSTOLIC BLOOD PRESSURE: 124 MMHG | DIASTOLIC BLOOD PRESSURE: 76 MMHG

## 2024-12-26 DIAGNOSIS — Z01.419 WOMEN'S ANNUAL ROUTINE GYNECOLOGICAL EXAMINATION: ICD-10-CM

## 2024-12-26 DIAGNOSIS — Z12.4 SCREENING FOR CERVICAL CANCER: Primary | ICD-10-CM

## 2024-12-26 DIAGNOSIS — R23.2 HOT FLASHES: ICD-10-CM

## 2024-12-26 DIAGNOSIS — N90.89 VULVAR IRRITATION: ICD-10-CM

## 2024-12-26 RX ORDER — FLUTICASONE PROPIONATE 50 MCG
SPRAY, SUSPENSION (ML) NASAL
COMMUNITY
Start: 2024-12-23

## 2024-12-26 RX ORDER — AMANTADINE HYDROCHLORIDE 100 MG/1
100 TABLET ORAL
COMMUNITY
Start: 2024-11-04 | End: 2025-11-04

## 2024-12-26 RX ORDER — MIRABEGRON 50 MG/1
TABLET, FILM COATED, EXTENDED RELEASE ORAL
COMMUNITY
Start: 2024-09-01

## 2024-12-26 NOTE — PROGRESS NOTES
Postmenopausal visit    Chief Complaint   Patient presents with    Follow-up     Hot flashes and vulvar irritation    Annual exam     Subjective     HPI  Juanis Carpenter is a 74 y.o. female, , who presents as a(n) established patient. She is postmenopausal. Since her last visit the patient was diagnosed with Parkinson's disease . Marital Status: .  She is not currently sexually active. STD testing recommendations have been explained to the patient and she declines STD testing.Needs her annual exam     Patient reports problems with: Symptoms of hot flashes and atrophic vaginitis have not improved since last year. Hot flashes occur when the patient is trying to get ready in the morning (and when she is physically active); her PCP thinks it may be related to the Parkinson's. Vulvar itching has not improved with dermatologist recommendations of dexatin/hydrocortisone/anti-itch-lotion mixture.    Patient reports no urinary incontinence since being on Myrbetriq.  Patient reports that she has been diagnosed with Parkinson disease and is trying new medications.  She also has frequency and leaking of urine and is on a new medicine Myrbetriq that works well.  Pap was done today per patient request even though it has been less then 2 years since her last Pap smear. After discussing that the potential for non-coverage of PAP, an ABN was signed.    Additional OB/GYN History     On HRT? No    Last Pap: 2023. Results: negative. HPV:  not done    Last Completed Pap Smear            PAP SMEAR (Every 2 Years) Order placed this encounter      2023  LIQUID-BASED PAP SMEAR WITH HPV GENOTYPING IF ASCUS ([facility])    12/15/2022  LIQUID-BASED PAP SMEAR, [facility] ([facility])    2020  Pap IG, Rfx HPV ASCU    2018  Done - in [place] (negative)                  History of abnormal Pap smear: no  Family history of uterine, colon, breast, or ovarian cancer: no  Performs monthly  Self-Breast Exam: yes  Last mammogram: 12/27/23. Done at [facility]; next mammogram scheduled for 12/30/24.    Last Completed Mammogram       This patient has no relevant Health Maintenance data.          Last colonoscopy: has had a colonoscopy 2 years ago    Last Completed Colonoscopy            COLORECTAL CANCER SCREENING (COLONOSCOPY - Every 5 Years) Next due on 12/26/2027 12/26/2022  COLONOSCOPY (Done - 3 benign polyps removed, per patient; performed at [facility]. Approximate dating)    12/03/2012  COLONOSCOPY (Done - normal, per patient)                  Her last bone density scan was 3 years ago and results were Osteopenia at right femoral neck  Exercises Regularly: yes - physical therapy 2x weekly for Parkinson's  Feelings of Anxiety or Depression: no    Tobacco Usage?: No       Current Outpatient Medications:     brimonidine (ALPHAGAN) 0.2 % ophthalmic solution, , Disp: , Rfl:     cetirizine (zyrTEC) 10 MG tablet, Take 1 tablet by mouth Daily., Disp: , Rfl:     [facility] Aspirin Low Dose 81 MG EC tablet, TAKE 1 TABLET BY MOUTH EVERY DAY, Disp: 90 tablet, Rfl: 3    DULoxetine (CYMBALTA) 30 MG capsule, 1 capsule Daily., Disp: , Rfl:     esomeprazole (nexIUM) 20 MG capsule, Take 1 capsule by mouth Every Morning Before Breakfast., Disp: , Rfl:     fluticasone (FLONASE) 50 MCG/ACT nasal spray, , Disp: , Rfl:     hydrocortisone 2.5 % cream, APPLY TO AFFECTED GROIN AREA THREE TIMES DAILY AS NEEDED, Disp: , Rfl:     ibuprofen (ADVIL,MOTRIN) 600 MG tablet, Take 1 tablet as needed by oral route., Disp: , Rfl:     losartan-hydrochlorothiazide (HYZAAR) 100-12.5 MG per tablet, Take 1 tablet by mouth Daily., Disp: , Rfl:     montelukast (SINGULAIR) 10 MG tablet, montelukast 10 mg tablet  Take 1 tablet every day by oral route., Disp: , Rfl:     Myrbetriq 50 MG tablet sustained-release 24 hour 24 hr tablet, , Disp: , Rfl:     Omega-3 Fatty Acids (FISH OIL) 1000 MG capsule capsule, Take  by mouth Daily With Breakfast.,  Disp: , Rfl:     pseudoephedrine-guaifenesin (MUCINEX D)  MG per 12 hr tablet, Take by oral route., Disp: , Rfl:     timolol (TIMOPTIC) 0.5 % ophthalmic solution, , Disp: , Rfl:     TobraDex 0.3-0.1 % ophthalmic ointment, , Disp: , Rfl:     verapamil ER (VERELAN) 240 MG 24 hr capsule, Take 1 capsule by mouth Daily., Disp: , Rfl:     vitamin C (ASCORBIC ACID) 500 MG tablet, Take 1 tablet by mouth Daily., Disp: , Rfl:     amantadine (SYMMETREL) 100 MG tablet, Take 1 tablet by mouth. (Patient not taking: Reported on 2024), Disp: , Rfl:     FLUTICASONE PROPIONATE, NASAL, NA, into the nostril(s) as directed by provider., Disp: , Rfl:     furosemide (LASIX) 20 MG tablet, , Disp: , Rfl:     neomycin-polymyxin-dexamethamethasone (POLYDEX) 3.5-89687-5.1 ointment ophthalmic ointment, APPLY INTO BOTH EYES AT BEDTIME (Patient not taking: Reported on 2024), Disp: , Rfl:     Patient denies the need for medication refills today.    OB History          4    Para   2    Term   2       0    AB   2    Living   2         SAB   2    IAB   0    Ectopic   0    Molar   0    Multiple   0    Live Births   2                Past Medical History:   Diagnosis Date    Arthritis     Arthritis of back     Arthritis of neck     Claustrophobia     Difficulty walking     Eczema     Essential tremor 2020    Hip arthrosis     History of anxiety     History of colon polyps     History of colonic diverticulitis     History of emphysema     Hypertension     Imbalance     since knee surgery    Irritable bowel syndrome (IBS)     irritable bowel constipation    Knee swelling     Low back pain     Osteopenia     Parkinson's disease 2024    Sleep apnea with use of continuous positive airway pressure (CPAP)     Urinary incontinence     Use of cane as ambulatory aid         Past Surgical History:   Procedure Laterality Date     SECTION      x2    CHEST TUBE INSERTION  2012    for emphysema    CHOLECYSTECTOMY    "   DILATATION AND CURETTAGE      LAPAROSCOPIC TUBAL LIGATION      OOPHORECTOMY      UNILATERAL for ectopic pregnancy    REPLACEMENT TOTAL KNEE Right 01/2018    Dr. Gaurang Gonzalez [place], [place]       Health Maintenance   Topic Date Due    TDAP/TD VACCINES (1 - Tdap) Never done    ZOSTER VACCINE (1 of 2) Never done    HEPATITIS C SCREENING  Never done    ANNUAL WELLNESS VISIT  Never done    DXA SCAN  11/26/2020    BMI FOLLOWUP  06/05/2021    MAMMOGRAM  12/27/2024    PAP SMEAR  12/19/2025    MOST FORM  12/26/2025    COLORECTAL CANCER SCREENING  12/26/2027    COVID-19 Vaccine  Completed    INFLUENZA VACCINE  Completed    Pneumococcal Vaccine 65+  Completed       The additional following portions of the patient's history were reviewed and updated as appropriate: allergies, current medications, past family history, past medical history, past social history, past surgical history, and problem list.    Review of Systems   Constitutional: Negative.    HENT: Negative.     Eyes: Negative.    Respiratory: Negative.     Cardiovascular: Negative.    Gastrointestinal: Negative.    Endocrine: Heat intolerance: hot flashes.   Genitourinary:         Vulvar itching   Musculoskeletal:  Positive for gait problem.   Skin: Negative.    Allergic/Immunologic: Negative.    Neurological: Negative.    Hematological: Negative.    Psychiatric/Behavioral: Negative.         I have reviewed and agree with the HPI, ROS, and historical information as entered above. Cm Falk MD           Objective   /76 (BP Location: Right arm, Patient Position: Sitting, Cuff Size: Adult)   Ht 148.6 cm (58.5\")   Wt 96.3 kg (212 lb 3.2 oz)   LMP  (LMP Unknown)   BMI 43.59 kg/m²     Physical Exam  Vitals and nursing note reviewed. Exam conducted with a chaperone present.   Constitutional:       Appearance: She is well-developed.   HENT:      Head: Normocephalic and atraumatic.   Neck:      Thyroid: No thyroid mass or thyromegaly.   Cardiovascular:      " Rate and Rhythm: Normal rate and regular rhythm.      Heart sounds: No murmur heard.  Pulmonary:      Effort: Pulmonary effort is normal. No retractions.      Breath sounds: Normal breath sounds. No wheezing, rhonchi or rales.   Chest:      Chest wall: No mass or tenderness.   Breasts:     Right: Normal. No mass, nipple discharge, skin change or tenderness.      Left: Normal. No mass, nipple discharge, skin change or tenderness.   Abdominal:      General: Bowel sounds are normal.      Palpations: Abdomen is soft. Abdomen is not rigid. There is no mass.      Tenderness: There is no abdominal tenderness. There is no guarding.      Hernia: No hernia is present. There is no hernia in the left inguinal area.   Genitourinary:     Labia:         Right: No rash, tenderness or lesion.         Left: No rash, tenderness or lesion.       Vagina: Normal. No vaginal discharge or lesions.      Cervix: No cervical motion tenderness, discharge, lesion or cervical bleeding.      Uterus: Normal. Not enlarged, not fixed and not tender.       Adnexa:         Right: No mass or tenderness.          Left: No mass or tenderness.        Rectum: No external hemorrhoid.   Musculoskeletal:      Cervical back: Normal range of motion. No muscular tenderness.   Neurological:      Mental Status: She is alert and oriented to person, place, and time.   Psychiatric:         Behavior: Behavior normal.       Assessment and Plan         Problem List Items Addressed This Visit    None  Visit Diagnoses       Screening for cervical cancer    -  Primary    Vulvar irritation        Hot flashes        Women's annual routine gynecological examination              Her Parkinsons is being treated and she has no significant female issues at this time except for frequency of urination which is now being treated well.  Reviewed monthly self breast exams.  Instructed to call with lumps, pain, or breast discharge.  Yearly mammograms ordered.  Ordered mammogram  today.  Recommended use of Vitamin D and getting adequate calcium in her diet. (1500mg)  Reviewed exercise as a preventative health measures.   Reviewed BMI and weight loss as preventative health measures.   Colonoscopy recommended.  RTC in 1 year or PRN with problems.  No follow-ups on file.    Cm Falk MD  12/26/2024

## 2024-12-27 LAB — REF LAB TEST METHOD: NORMAL

## 2024-12-30 ENCOUNTER — HOSPITAL ENCOUNTER (OUTPATIENT)
Dept: MAMMOGRAPHY | Facility: HOSPITAL | Age: 74
Discharge: HOME | End: 2024-12-30
Admitting: OBSTETRICS & GYNECOLOGY
Payer: MEDICARE

## 2024-12-30 DIAGNOSIS — Z12.31 SCREENING MAMMOGRAM FOR BREAST CANCER: ICD-10-CM

## 2024-12-30 PROCEDURE — 77063 BREAST TOMOSYNTHESIS BI: CPT

## 2024-12-30 PROCEDURE — 77067 SCR MAMMO BI INCL CAD: CPT
